# Patient Record
Sex: FEMALE | Race: WHITE | NOT HISPANIC OR LATINO
[De-identification: names, ages, dates, MRNs, and addresses within clinical notes are randomized per-mention and may not be internally consistent; named-entity substitution may affect disease eponyms.]

---

## 2017-09-11 ENCOUNTER — APPOINTMENT (OUTPATIENT)
Dept: ALLERGY | Facility: CLINIC | Age: 27
End: 2017-09-11

## 2018-02-07 ENCOUNTER — APPOINTMENT (OUTPATIENT)
Dept: OBGYN | Facility: CLINIC | Age: 28
End: 2018-02-07
Payer: COMMERCIAL

## 2018-02-07 PROCEDURE — 99203 OFFICE O/P NEW LOW 30 MIN: CPT

## 2019-03-28 ENCOUNTER — INPATIENT (INPATIENT)
Facility: HOSPITAL | Age: 29
LOS: 1 days | Discharge: HOME | End: 2019-03-30
Attending: SPECIALIST | Admitting: SPECIALIST
Payer: COMMERCIAL

## 2019-03-28 VITALS — HEIGHT: 71 IN | WEIGHT: 149.91 LBS

## 2019-03-28 DIAGNOSIS — O26.899 OTHER SPECIFIED PREGNANCY RELATED CONDITIONS, UNSPECIFIED TRIMESTER: ICD-10-CM

## 2019-03-28 DIAGNOSIS — Z3A.00 WEEKS OF GESTATION OF PREGNANCY NOT SPECIFIED: ICD-10-CM

## 2019-03-28 DIAGNOSIS — Z34.80 ENCOUNTER FOR SUPERVISION OF OTHER NORMAL PREGNANCY, UNSPECIFIED TRIMESTER: ICD-10-CM

## 2019-03-28 LAB
BASOPHILS # BLD AUTO: 0.1 K/UL — SIGNIFICANT CHANGE UP (ref 0–0.2)
BASOPHILS NFR BLD AUTO: 0.4 % — SIGNIFICANT CHANGE UP (ref 0–2)
BLD GP AB SCN SERPL QL: NEGATIVE — SIGNIFICANT CHANGE UP
EOSINOPHIL # BLD AUTO: 0 K/UL — SIGNIFICANT CHANGE UP (ref 0–0.5)
EOSINOPHIL NFR BLD AUTO: 0.3 % — SIGNIFICANT CHANGE UP (ref 0–6)
HCT VFR BLD CALC: 38.1 % — SIGNIFICANT CHANGE UP (ref 34.5–45)
HGB BLD-MCNC: 13.2 G/DL — SIGNIFICANT CHANGE UP (ref 11.5–15.5)
LYMPHOCYTES # BLD AUTO: 17.8 % — SIGNIFICANT CHANGE UP (ref 13–44)
LYMPHOCYTES # BLD AUTO: 2.4 K/UL — SIGNIFICANT CHANGE UP (ref 1–3.3)
MCHC RBC-ENTMCNC: 32.1 PG — SIGNIFICANT CHANGE UP (ref 27–34)
MCHC RBC-ENTMCNC: 34.6 GM/DL — SIGNIFICANT CHANGE UP (ref 32–36)
MCV RBC AUTO: 92.6 FL — SIGNIFICANT CHANGE UP (ref 80–100)
MONOCYTES # BLD AUTO: 0.7 K/UL — SIGNIFICANT CHANGE UP (ref 0–0.9)
MONOCYTES NFR BLD AUTO: 5.1 % — SIGNIFICANT CHANGE UP (ref 2–14)
NEUTROPHILS # BLD AUTO: 10.1 K/UL — HIGH (ref 1.8–7.4)
NEUTROPHILS NFR BLD AUTO: 76.3 % — SIGNIFICANT CHANGE UP (ref 43–77)
PLATELET # BLD AUTO: 339 K/UL — SIGNIFICANT CHANGE UP (ref 150–400)
RBC # BLD: 4.11 M/UL — SIGNIFICANT CHANGE UP (ref 3.8–5.2)
RBC # FLD: 11.9 % — SIGNIFICANT CHANGE UP (ref 10.3–14.5)
RH IG SCN BLD-IMP: POSITIVE — SIGNIFICANT CHANGE UP
RH IG SCN BLD-IMP: POSITIVE — SIGNIFICANT CHANGE UP
T PALLIDUM AB TITR SER: NEGATIVE — SIGNIFICANT CHANGE UP
WBC # BLD: 13.2 K/UL — HIGH (ref 3.8–10.5)
WBC # FLD AUTO: 13.2 K/UL — HIGH (ref 3.8–10.5)

## 2019-03-28 RX ORDER — HYDROCORTISONE 1 %
1 OINTMENT (GRAM) TOPICAL EVERY 4 HOURS
Qty: 0 | Refills: 0 | Status: DISCONTINUED | OUTPATIENT
Start: 2019-03-28 | End: 2019-03-30

## 2019-03-28 RX ORDER — SODIUM CHLORIDE 9 MG/ML
500 INJECTION, SOLUTION INTRAVENOUS ONCE
Qty: 0 | Refills: 0 | Status: COMPLETED | OUTPATIENT
Start: 2019-03-28 | End: 2019-03-28

## 2019-03-28 RX ORDER — AER TRAVELER 0.5 G/1
1 SOLUTION RECTAL; TOPICAL EVERY 4 HOURS
Qty: 0 | Refills: 0 | Status: DISCONTINUED | OUTPATIENT
Start: 2019-03-28 | End: 2019-03-28

## 2019-03-28 RX ORDER — TETANUS TOXOID, REDUCED DIPHTHERIA TOXOID AND ACELLULAR PERTUSSIS VACCINE, ADSORBED 5; 2.5; 8; 8; 2.5 [IU]/.5ML; [IU]/.5ML; UG/.5ML; UG/.5ML; UG/.5ML
0.5 SUSPENSION INTRAMUSCULAR ONCE
Qty: 0 | Refills: 0 | Status: DISCONTINUED | OUTPATIENT
Start: 2019-03-28 | End: 2019-03-30

## 2019-03-28 RX ORDER — OXYCODONE HYDROCHLORIDE 5 MG/1
5 TABLET ORAL EVERY 4 HOURS
Qty: 0 | Refills: 0 | Status: DISCONTINUED | OUTPATIENT
Start: 2019-03-28 | End: 2019-03-30

## 2019-03-28 RX ORDER — MAGNESIUM HYDROXIDE 400 MG/1
30 TABLET, CHEWABLE ORAL
Qty: 0 | Refills: 0 | Status: DISCONTINUED | OUTPATIENT
Start: 2019-03-28 | End: 2019-03-30

## 2019-03-28 RX ORDER — OXYTOCIN 10 UNIT/ML
41.67 VIAL (ML) INJECTION
Qty: 20 | Refills: 0 | Status: DISCONTINUED | OUTPATIENT
Start: 2019-03-28 | End: 2019-03-30

## 2019-03-28 RX ORDER — KETOROLAC TROMETHAMINE 30 MG/ML
30 SYRINGE (ML) INJECTION ONCE
Qty: 0 | Refills: 0 | Status: DISCONTINUED | OUTPATIENT
Start: 2019-03-28 | End: 2019-03-28

## 2019-03-28 RX ORDER — IBUPROFEN 200 MG
600 TABLET ORAL EVERY 6 HOURS
Qty: 0 | Refills: 0 | Status: DISCONTINUED | OUTPATIENT
Start: 2019-03-28 | End: 2019-03-30

## 2019-03-28 RX ORDER — OXYTOCIN 10 UNIT/ML
333.33 VIAL (ML) INJECTION
Qty: 20 | Refills: 0 | Status: COMPLETED | OUTPATIENT
Start: 2019-03-28

## 2019-03-28 RX ORDER — HYDROCORTISONE 1 %
1 OINTMENT (GRAM) TOPICAL EVERY 4 HOURS
Qty: 0 | Refills: 0 | Status: DISCONTINUED | OUTPATIENT
Start: 2019-03-28 | End: 2019-03-28

## 2019-03-28 RX ORDER — IBUPROFEN 200 MG
600 TABLET ORAL EVERY 6 HOURS
Qty: 0 | Refills: 0 | Status: COMPLETED | OUTPATIENT
Start: 2019-03-28 | End: 2020-02-24

## 2019-03-28 RX ORDER — GLYCERIN ADULT
1 SUPPOSITORY, RECTAL RECTAL AT BEDTIME
Qty: 0 | Refills: 0 | Status: DISCONTINUED | OUTPATIENT
Start: 2019-03-28 | End: 2019-03-30

## 2019-03-28 RX ORDER — OXYTOCIN 10 UNIT/ML
41.67 VIAL (ML) INJECTION
Qty: 20 | Refills: 0 | Status: DISCONTINUED | OUTPATIENT
Start: 2019-03-28 | End: 2019-03-28

## 2019-03-28 RX ORDER — DIBUCAINE 1 %
1 OINTMENT (GRAM) RECTAL EVERY 4 HOURS
Qty: 0 | Refills: 0 | Status: DISCONTINUED | OUTPATIENT
Start: 2019-03-28 | End: 2019-03-30

## 2019-03-28 RX ORDER — OXYCODONE HYDROCHLORIDE 5 MG/1
5 TABLET ORAL
Qty: 0 | Refills: 0 | Status: DISCONTINUED | OUTPATIENT
Start: 2019-03-28 | End: 2019-03-30

## 2019-03-28 RX ORDER — DOCUSATE SODIUM 100 MG
100 CAPSULE ORAL
Qty: 0 | Refills: 0 | Status: DISCONTINUED | OUTPATIENT
Start: 2019-03-28 | End: 2019-03-30

## 2019-03-28 RX ORDER — PRAMOXINE HYDROCHLORIDE 150 MG/15G
1 AEROSOL, FOAM RECTAL EVERY 4 HOURS
Qty: 0 | Refills: 0 | Status: DISCONTINUED | OUTPATIENT
Start: 2019-03-28 | End: 2019-03-30

## 2019-03-28 RX ORDER — DIPHENHYDRAMINE HCL 50 MG
25 CAPSULE ORAL EVERY 6 HOURS
Qty: 0 | Refills: 0 | Status: DISCONTINUED | OUTPATIENT
Start: 2019-03-28 | End: 2019-03-30

## 2019-03-28 RX ORDER — PRAMOXINE HYDROCHLORIDE 150 MG/15G
1 AEROSOL, FOAM RECTAL EVERY 4 HOURS
Qty: 0 | Refills: 0 | Status: DISCONTINUED | OUTPATIENT
Start: 2019-03-28 | End: 2019-03-28

## 2019-03-28 RX ORDER — SIMETHICONE 80 MG/1
80 TABLET, CHEWABLE ORAL EVERY 6 HOURS
Qty: 0 | Refills: 0 | Status: DISCONTINUED | OUTPATIENT
Start: 2019-03-28 | End: 2019-03-30

## 2019-03-28 RX ORDER — SODIUM CHLORIDE 9 MG/ML
1000 INJECTION, SOLUTION INTRAVENOUS
Qty: 0 | Refills: 0 | Status: DISCONTINUED | OUTPATIENT
Start: 2019-03-28 | End: 2019-03-28

## 2019-03-28 RX ORDER — AER TRAVELER 0.5 G/1
1 SOLUTION RECTAL; TOPICAL EVERY 4 HOURS
Qty: 0 | Refills: 0 | Status: DISCONTINUED | OUTPATIENT
Start: 2019-03-28 | End: 2019-03-30

## 2019-03-28 RX ORDER — CITRIC ACID/SODIUM CITRATE 300-500 MG
15 SOLUTION, ORAL ORAL EVERY 4 HOURS
Qty: 0 | Refills: 0 | Status: DISCONTINUED | OUTPATIENT
Start: 2019-03-28 | End: 2019-03-28

## 2019-03-28 RX ORDER — SODIUM CHLORIDE 9 MG/ML
3 INJECTION INTRAMUSCULAR; INTRAVENOUS; SUBCUTANEOUS EVERY 8 HOURS
Qty: 0 | Refills: 0 | Status: DISCONTINUED | OUTPATIENT
Start: 2019-03-28 | End: 2019-03-30

## 2019-03-28 RX ORDER — DIBUCAINE 1 %
1 OINTMENT (GRAM) RECTAL EVERY 4 HOURS
Qty: 0 | Refills: 0 | Status: DISCONTINUED | OUTPATIENT
Start: 2019-03-28 | End: 2019-03-28

## 2019-03-28 RX ORDER — ACETAMINOPHEN 500 MG
975 TABLET ORAL EVERY 6 HOURS
Qty: 0 | Refills: 0 | Status: DISCONTINUED | OUTPATIENT
Start: 2019-03-28 | End: 2019-03-30

## 2019-03-28 RX ORDER — BENZOCAINE 10 %
1 GEL (GRAM) MUCOUS MEMBRANE EVERY 6 HOURS
Qty: 0 | Refills: 0 | Status: DISCONTINUED | OUTPATIENT
Start: 2019-03-28 | End: 2019-03-30

## 2019-03-28 RX ORDER — SODIUM CHLORIDE 9 MG/ML
3 INJECTION INTRAMUSCULAR; INTRAVENOUS; SUBCUTANEOUS EVERY 8 HOURS
Qty: 0 | Refills: 0 | Status: DISCONTINUED | OUTPATIENT
Start: 2019-03-28 | End: 2019-03-28

## 2019-03-28 RX ORDER — OXYTOCIN 10 UNIT/ML
333.33 VIAL (ML) INJECTION
Qty: 20 | Refills: 0 | Status: DISCONTINUED | OUTPATIENT
Start: 2019-03-28 | End: 2019-03-28

## 2019-03-28 RX ORDER — LANOLIN
1 OINTMENT (GRAM) TOPICAL EVERY 6 HOURS
Qty: 0 | Refills: 0 | Status: DISCONTINUED | OUTPATIENT
Start: 2019-03-28 | End: 2019-03-30

## 2019-03-28 RX ORDER — ACETAMINOPHEN 500 MG
975 TABLET ORAL EVERY 6 HOURS
Qty: 0 | Refills: 0 | Status: COMPLETED | OUTPATIENT
Start: 2019-03-28 | End: 2020-02-24

## 2019-03-28 RX ADMIN — Medication 1000 MILLIUNIT(S)/MIN: at 12:38

## 2019-03-28 RX ADMIN — Medication 975 MILLIGRAM(S): at 18:17

## 2019-03-28 RX ADMIN — SODIUM CHLORIDE 3 MILLILITER(S): 9 INJECTION INTRAMUSCULAR; INTRAVENOUS; SUBCUTANEOUS at 22:49

## 2019-03-28 RX ADMIN — SODIUM CHLORIDE 250 MILLILITER(S): 9 INJECTION, SOLUTION INTRAVENOUS at 08:16

## 2019-03-28 RX ADMIN — SODIUM CHLORIDE 1000 MILLILITER(S): 9 INJECTION, SOLUTION INTRAVENOUS at 08:17

## 2019-03-28 RX ADMIN — Medication 125 MILLIUNIT(S)/MIN: at 12:33

## 2019-03-28 RX ADMIN — Medication 30 MILLIGRAM(S): at 13:30

## 2019-03-28 RX ADMIN — Medication 30 MILLIGRAM(S): at 13:13

## 2019-03-28 RX ADMIN — Medication 600 MILLIGRAM(S): at 23:40

## 2019-03-28 RX ADMIN — Medication 975 MILLIGRAM(S): at 18:45

## 2019-03-28 RX ADMIN — Medication 600 MILLIGRAM(S): at 22:49

## 2019-03-28 NOTE — PRE-ANESTHESIA EVALUATION ADULT - NSANTHOSAYNRD_GEN_A_CORE
No. BRUCE screening performed.  STOP BANG Legend: 0-2 = LOW Risk; 3-4 = INTERMEDIATE Risk; 5-8 = HIGH Risk Statement Selected

## 2019-03-28 NOTE — PROVIDER CONTACT NOTE (OTHER) - SITUATION
pt. felt tightness in chest when getting out of bed. Did not mention to RN until after walking to bathroom and once getting into bed.  pt. said she felt light headed and her chest felt tight only once

## 2019-03-28 NOTE — PROVIDER CONTACT NOTE (OTHER) - ACTION/TREATMENT ORDERED:
since the patient only felt her chest tight once when she got out of bed, no action at this time. Pt. to notify RN if she feels this again. pt. to go to post partum, will continue to monitor

## 2019-03-29 LAB
HCT VFR BLD CALC: 32.7 % — LOW (ref 34.5–45)
HGB BLD-MCNC: 11.3 G/DL — LOW (ref 11.5–15.5)

## 2019-03-29 RX ADMIN — Medication 975 MILLIGRAM(S): at 01:30

## 2019-03-29 RX ADMIN — Medication 600 MILLIGRAM(S): at 11:27

## 2019-03-29 RX ADMIN — Medication 100 MILLIGRAM(S): at 11:27

## 2019-03-29 RX ADMIN — Medication 600 MILLIGRAM(S): at 12:33

## 2019-03-29 RX ADMIN — Medication 600 MILLIGRAM(S): at 18:28

## 2019-03-29 RX ADMIN — Medication 600 MILLIGRAM(S): at 17:14

## 2019-03-29 RX ADMIN — Medication 975 MILLIGRAM(S): at 06:48

## 2019-03-29 RX ADMIN — Medication 600 MILLIGRAM(S): at 23:41

## 2019-03-29 RX ADMIN — Medication 600 MILLIGRAM(S): at 06:30

## 2019-03-29 RX ADMIN — Medication 975 MILLIGRAM(S): at 20:17

## 2019-03-29 RX ADMIN — Medication 975 MILLIGRAM(S): at 00:40

## 2019-03-29 RX ADMIN — Medication 600 MILLIGRAM(S): at 05:34

## 2019-03-29 RX ADMIN — Medication 975 MILLIGRAM(S): at 21:15

## 2019-03-29 RX ADMIN — Medication 975 MILLIGRAM(S): at 14:10

## 2019-03-29 RX ADMIN — Medication 975 MILLIGRAM(S): at 15:00

## 2019-03-29 NOTE — PROGRESS NOTE ADULT - PROBLEM SELECTOR PLAN 1
- Pain well controlled, continue current pain regimen  - Increase ambulation, SCDs when not ambulating  - Continue regular diet  -AM H/H    Yulia Lobo PGY1

## 2019-03-29 NOTE — PROGRESS NOTE ADULT - SUBJECTIVE AND OBJECTIVE BOX
OB Progress Note:  PPD#1    S: 27yo  PPD#1 s/p . Patient feels well. Pain is well controlled. She is tolerating a regular diet and passing flatus. She is voiding spontaneously, and ambulating without difficulty. Denies CP/SOB. Denies lightheadedness/dizziness. Denies N/V.    O:  Vitals:  Vital Signs Last 24 Hrs  T(C): 36.6 (29 Mar 2019 06:00), Max: 37.1 (28 Mar 2019 21:27)  T(F): 97.9 (29 Mar 2019 06:00), Max: 98.7 (28 Mar 2019 21:27)  HR: 69 (29 Mar 2019 06:00) (60 - 82)  BP: 100/67 (29 Mar 2019 06:00) (95/60 - 117/56)  BP(mean): 76 (28 Mar 2019 15:21) (65 - 78)  RR: 18 (29 Mar 2019 06:00) (12 - 18)  SpO2: 98% (28 Mar 2019 21:27) (95% - 99%)    MEDICATIONS  (STANDING):  acetaminophen   Tablet .. 975 milliGRAM(s) Oral every 6 hours  diphtheria/tetanus/pertussis (acellular) Vaccine (ADAcel) 0.5 milliLiter(s) IntraMuscular once  ibuprofen  Tablet. 600 milliGRAM(s) Oral every 6 hours  oxyCODONE    IR 5 milliGRAM(s) Oral every 3 hours  oxytocin Infusion 41.667 milliUNIT(s)/Min (125 mL/Hr) IV Continuous <Continuous>  prenatal multivitamin 1 Tablet(s) Oral daily  sodium chloride 0.9% lock flush 3 milliLiter(s) IV Push every 8 hours      Labs:  Blood type: A Positive  Rubella IgG: RPR: Negative                          13.2   13.2<H> >-----------< 339    (  @ 07:41 )             38.1          Physical Exam:  General: NAD  Abdomen: soft, non-tender, non-distended, fundus firm  Vaginal: Lochia wnl  Extremities: No erythema/edema

## 2019-03-29 NOTE — PROGRESS NOTE ADULT - ATTENDING COMMENTS
Pt seen and examined by me. Had gush of blood this AM after using the bathroom.  Also c/o pain at epi site. No fevers or chills. Otherwise since then bleeding has been fine.  VSS and wnl. Epi site wnl. no E/O infection. Bleeding on pad wnl.  Pt to continue pain meds and to continue to monitor bleeding. H&H 11.3/32.7 this AM. Will continue to monitor.     TOMMY Romo MD  [phone]

## 2019-03-30 ENCOUNTER — TRANSCRIPTION ENCOUNTER (OUTPATIENT)
Age: 29
End: 2019-03-30

## 2019-03-30 VITALS
TEMPERATURE: 98 F | DIASTOLIC BLOOD PRESSURE: 62 MMHG | SYSTOLIC BLOOD PRESSURE: 96 MMHG | RESPIRATION RATE: 18 BRPM | HEART RATE: 69 BPM

## 2019-03-30 PROCEDURE — 86780 TREPONEMA PALLIDUM: CPT

## 2019-03-30 PROCEDURE — 59050 FETAL MONITOR W/REPORT: CPT

## 2019-03-30 PROCEDURE — 85027 COMPLETE CBC AUTOMATED: CPT

## 2019-03-30 PROCEDURE — 59025 FETAL NON-STRESS TEST: CPT

## 2019-03-30 PROCEDURE — G0463: CPT

## 2019-03-30 PROCEDURE — 86850 RBC ANTIBODY SCREEN: CPT

## 2019-03-30 PROCEDURE — 85014 HEMATOCRIT: CPT

## 2019-03-30 PROCEDURE — 86901 BLOOD TYPING SEROLOGIC RH(D): CPT

## 2019-03-30 PROCEDURE — 85018 HEMOGLOBIN: CPT

## 2019-03-30 PROCEDURE — 86900 BLOOD TYPING SEROLOGIC ABO: CPT

## 2019-03-30 RX ORDER — ACETAMINOPHEN 500 MG
3 TABLET ORAL
Qty: 0 | Refills: 0 | DISCHARGE
Start: 2019-03-30

## 2019-03-30 RX ORDER — IBUPROFEN 200 MG
1 TABLET ORAL
Qty: 0 | Refills: 0 | DISCHARGE
Start: 2019-03-30

## 2019-03-30 RX ADMIN — Medication 975 MILLIGRAM(S): at 03:47

## 2019-03-30 RX ADMIN — Medication 600 MILLIGRAM(S): at 05:51

## 2019-03-30 RX ADMIN — Medication 975 MILLIGRAM(S): at 04:20

## 2019-03-30 RX ADMIN — Medication 100 MILLIGRAM(S): at 10:57

## 2019-03-30 RX ADMIN — Medication 600 MILLIGRAM(S): at 06:22

## 2019-03-30 RX ADMIN — Medication 600 MILLIGRAM(S): at 00:15

## 2019-03-30 RX ADMIN — Medication 600 MILLIGRAM(S): at 10:56

## 2019-03-30 NOTE — DISCHARGE NOTE OB - CARE PLAN
Principal Discharge DX:	Vaginal delivery  Goal:	Rest  Assessment and plan of treatment:	Please call the office to schedule a 6 weeks postpartum appointment.

## 2019-03-30 NOTE — DISCHARGE NOTE OB - MEDICATION SUMMARY - MEDICATIONS TO TAKE
I will START or STAY ON the medications listed below when I get home from the hospital:    acetaminophen 325 mg oral tablet  -- 3 tab(s) by mouth every 6 hours  -- Indication: For Pain    ibuprofen 600 mg oral tablet  -- 1 tab(s) by mouth every 6 hours  -- Indication: For Pain    Prenatal Multivitamins with Folic Acid 1 mg oral tablet  -- 1 tab(s) by mouth once a day  -- Indication: For Postpartum

## 2019-03-30 NOTE — DISCHARGE NOTE OB - PATIENT PORTAL LINK FT
You can access the [facility] Patient Portal, offered by [facility], by registering with the following website: [email or web address]

## 2019-03-30 NOTE — PROGRESS NOTE ADULT - ASSESSMENT
S/P vaginal delivery on 3/28 with RML repair  She is doing well  Continue with PO pain medication  Ambulation encouraged.  Discharge instructions reviewed as written in EMR  Follow up in office in 6 weeks  Call MD if fever, pain, heavy VB, HA or visual changes.    Demetrice Love MD

## 2019-03-30 NOTE — DISCHARGE NOTE OB - CARE PROVIDER_API CALL
Demetrice Love)  OBSGYN  General  [facility] [place] [id number]  Phone: [phone]  Fax: [phone]  Follow Up Time:

## 2019-03-30 NOTE — PROGRESS NOTE ADULT - SUBJECTIVE AND OBJECTIVE BOX
VANNA SEXTON  MRN-[id number]  28y    Subjective:  This patient is doing well.  She is ambulating, tolerating diet, lochia wnl, no CP, SOB, N/V/D.  No HA, no visual changes.  Vital Signs Last 24 Hrs  T(C): 36.8 (30 Mar 2019 05:57), Max: 36.8 (29 Mar 2019 18:00)  T(F): 98.3 (30 Mar 2019 05:57), Max: 98.3 (30 Mar 2019 05:57)  HR: 69 (30 Mar 2019 05:57) (69 - 70)  BP: 96/62 (30 Mar 2019 05:57) (96/62 - 99/66)  BP(mean): --  RR: 18 (30 Mar 2019 05:57) (18 - 18)  SpO2: 100% (29 Mar 2019 18:00) (100% - 100%)    I&O's Summary                            11.3   x     )-----------( x        ( 29 Mar 2019 06:54 )             32.7       Allergies    Allergy Status Unknown    Intolerances        MEDICATIONS  (STANDING):  acetaminophen   Tablet .. 975 milliGRAM(s) Oral every 6 hours  diphtheria/tetanus/pertussis (acellular) Vaccine (ADAcel) 0.5 milliLiter(s) IntraMuscular once  ibuprofen  Tablet. 600 milliGRAM(s) Oral every 6 hours  oxyCODONE    IR 5 milliGRAM(s) Oral every 3 hours  oxytocin Infusion 41.667 milliUNIT(s)/Min (125 mL/Hr) IV Continuous <Continuous>  prenatal multivitamin 1 Tablet(s) Oral daily  sodium chloride 0.9% lock flush 3 milliLiter(s) IV Push every 8 hours    MEDICATIONS  (PRN):  benzocaine 20%/menthol 0.5% Spray 1 Spray(s) Topical every 6 hours PRN Perineal discomfort  dibucaine 1% Ointment 1 Application(s) Topical every 4 hours PRN Perineal Discomfort  diphenhydrAMINE 25 milliGRAM(s) Oral every 6 hours PRN Itching  docusate sodium 100 milliGRAM(s) Oral two times a day PRN Stool Softening  glycerin Suppository - Adult 1 Suppository(s) Rectal at bedtime PRN Constipation  hydrocortisone 1% Cream 1 Application(s) Topical every 4 hours PRN Moderate to Severe Perineal Pain  lanolin Ointment 1 Application(s) Topical every 6 hours PRN Sore Nipples  magnesium hydroxide Suspension 30 milliLiter(s) Oral two times a day PRN Constipation  oxyCODONE    IR 5 milliGRAM(s) Oral every 4 hours PRN Severe Pain (7 -10)  pramoxine 1%/zinc 5% Cream 1 Application(s) Topical every 4 hours PRN Moderate to Severe Perineal Pain  simethicone 80 milliGRAM(s) Chew every 6 hours PRN Gas  witch hazel Pads 1 Application(s) Topical every 4 hours PRN Perineal Discomfort      PHYSICAL EXAM:    Extremities: non-tender bilateraly  Abdomen: soft, nontender, fundus firm

## 2019-03-30 NOTE — DISCHARGE NOTE OB - MATERIALS PROVIDED
Breastfeeding Mother’s Support Group Information/Birth Certificate Instructions/[facility] Hearing Screen Program/Back To Sleep Handout/Shaken Baby Prevention Handout/[facility] [place] Screening Program

## 2019-05-07 ENCOUNTER — RESULT REVIEW (OUTPATIENT)
Age: 29
End: 2019-05-07

## 2019-11-12 ENCOUNTER — TRANSCRIPTION ENCOUNTER (OUTPATIENT)
Age: 29
End: 2019-11-12

## 2020-03-19 ENCOUNTER — RESULT REVIEW (OUTPATIENT)
Age: 30
End: 2020-03-19

## 2020-09-11 ENCOUNTER — OUTPATIENT (OUTPATIENT)
Dept: OUTPATIENT SERVICES | Facility: HOSPITAL | Age: 30
LOS: 1 days | End: 2020-09-11
Payer: COMMERCIAL

## 2020-09-11 DIAGNOSIS — Z3A.00 WEEKS OF GESTATION OF PREGNANCY NOT SPECIFIED: ICD-10-CM

## 2020-09-11 DIAGNOSIS — O26.899 OTHER SPECIFIED PREGNANCY RELATED CONDITIONS, UNSPECIFIED TRIMESTER: ICD-10-CM

## 2020-09-11 PROCEDURE — G0463: CPT

## 2020-09-11 PROCEDURE — 59025 FETAL NON-STRESS TEST: CPT

## 2020-09-11 PROCEDURE — 59025 FETAL NON-STRESS TEST: CPT | Mod: 26

## 2020-09-22 ENCOUNTER — TRANSCRIPTION ENCOUNTER (OUTPATIENT)
Age: 30
End: 2020-09-22

## 2020-10-07 ENCOUNTER — TRANSCRIPTION ENCOUNTER (OUTPATIENT)
Age: 30
End: 2020-10-07

## 2020-10-07 ENCOUNTER — OUTPATIENT (OUTPATIENT)
Dept: OUTPATIENT SERVICES | Facility: HOSPITAL | Age: 30
LOS: 1 days | End: 2020-10-07
Payer: COMMERCIAL

## 2020-10-07 VITALS
HEART RATE: 76 BPM | TEMPERATURE: 98 F | SYSTOLIC BLOOD PRESSURE: 102 MMHG | DIASTOLIC BLOOD PRESSURE: 57 MMHG | OXYGEN SATURATION: 97 % | RESPIRATION RATE: 16 BRPM

## 2020-10-07 DIAGNOSIS — O26.899 OTHER SPECIFIED PREGNANCY RELATED CONDITIONS, UNSPECIFIED TRIMESTER: ICD-10-CM

## 2020-10-07 DIAGNOSIS — Z3A.00 WEEKS OF GESTATION OF PREGNANCY NOT SPECIFIED: ICD-10-CM

## 2020-10-07 DIAGNOSIS — Z34.80 ENCOUNTER FOR SUPERVISION OF OTHER NORMAL PREGNANCY, UNSPECIFIED TRIMESTER: ICD-10-CM

## 2020-10-07 LAB
BASOPHILS # BLD AUTO: 0.02 K/UL — SIGNIFICANT CHANGE UP (ref 0–0.2)
BASOPHILS NFR BLD AUTO: 0.2 % — SIGNIFICANT CHANGE UP (ref 0–2)
BLD GP AB SCN SERPL QL: NEGATIVE — SIGNIFICANT CHANGE UP
EOSINOPHIL # BLD AUTO: 0.02 K/UL — SIGNIFICANT CHANGE UP (ref 0–0.5)
EOSINOPHIL NFR BLD AUTO: 0.2 % — SIGNIFICANT CHANGE UP (ref 0–6)
HCT VFR BLD CALC: 35 % — SIGNIFICANT CHANGE UP (ref 34.5–45)
HGB BLD-MCNC: 11.6 G/DL — SIGNIFICANT CHANGE UP (ref 11.5–15.5)
IMM GRANULOCYTES NFR BLD AUTO: 0.6 % — SIGNIFICANT CHANGE UP (ref 0–1.5)
LYMPHOCYTES # BLD AUTO: 1.64 K/UL — SIGNIFICANT CHANGE UP (ref 1–3.3)
LYMPHOCYTES # BLD AUTO: 15.7 % — SIGNIFICANT CHANGE UP (ref 13–44)
MCHC RBC-ENTMCNC: 30.1 PG — SIGNIFICANT CHANGE UP (ref 27–34)
MCHC RBC-ENTMCNC: 33.1 GM/DL — SIGNIFICANT CHANGE UP (ref 32–36)
MCV RBC AUTO: 90.7 FL — SIGNIFICANT CHANGE UP (ref 80–100)
MONOCYTES # BLD AUTO: 0.6 K/UL — SIGNIFICANT CHANGE UP (ref 0–0.9)
MONOCYTES NFR BLD AUTO: 5.8 % — SIGNIFICANT CHANGE UP (ref 2–14)
NEUTROPHILS # BLD AUTO: 8.09 K/UL — HIGH (ref 1.8–7.4)
NEUTROPHILS NFR BLD AUTO: 77.5 % — HIGH (ref 43–77)
NRBC # BLD: 0 /100 WBCS — SIGNIFICANT CHANGE UP (ref 0–0)
PLATELET # BLD AUTO: 314 K/UL — SIGNIFICANT CHANGE UP (ref 150–400)
RBC # BLD: 3.86 M/UL — SIGNIFICANT CHANGE UP (ref 3.8–5.2)
RBC # FLD: 12.3 % — SIGNIFICANT CHANGE UP (ref 10.3–14.5)
RH IG SCN BLD-IMP: POSITIVE — SIGNIFICANT CHANGE UP
SARS-COV-2 RNA SPEC QL NAA+PROBE: SIGNIFICANT CHANGE UP
WBC # BLD: 10.43 K/UL — SIGNIFICANT CHANGE UP (ref 3.8–10.5)
WBC # FLD AUTO: 10.43 K/UL — SIGNIFICANT CHANGE UP (ref 3.8–10.5)

## 2020-10-07 PROCEDURE — 86769 SARS-COV-2 COVID-19 ANTIBODY: CPT

## 2020-10-07 PROCEDURE — 86780 TREPONEMA PALLIDUM: CPT

## 2020-10-07 PROCEDURE — 86850 RBC ANTIBODY SCREEN: CPT

## 2020-10-07 PROCEDURE — 87635 SARS-COV-2 COVID-19 AMP PRB: CPT

## 2020-10-07 PROCEDURE — 59025 FETAL NON-STRESS TEST: CPT

## 2020-10-07 PROCEDURE — 86901 BLOOD TYPING SEROLOGIC RH(D): CPT

## 2020-10-07 PROCEDURE — 85025 COMPLETE CBC W/AUTO DIFF WBC: CPT

## 2020-10-07 PROCEDURE — 86900 BLOOD TYPING SEROLOGIC ABO: CPT

## 2020-10-07 PROCEDURE — G0463: CPT

## 2020-10-07 RX ORDER — SODIUM CHLORIDE 9 MG/ML
500 INJECTION, SOLUTION INTRAVENOUS ONCE
Refills: 0 | Status: DISCONTINUED | OUTPATIENT
Start: 2020-10-07 | End: 2020-10-07

## 2020-10-07 RX ORDER — CITRIC ACID/SODIUM CITRATE 300-500 MG
15 SOLUTION, ORAL ORAL EVERY 6 HOURS
Refills: 0 | Status: DISCONTINUED | OUTPATIENT
Start: 2020-10-07 | End: 2020-10-07

## 2020-10-07 RX ORDER — OXYTOCIN 10 UNIT/ML
333.33 VIAL (ML) INJECTION
Qty: 20 | Refills: 0 | Status: DISCONTINUED | OUTPATIENT
Start: 2020-10-07 | End: 2020-10-07

## 2020-10-07 RX ORDER — SODIUM CHLORIDE 9 MG/ML
1000 INJECTION, SOLUTION INTRAVENOUS
Refills: 0 | Status: DISCONTINUED | OUTPATIENT
Start: 2020-10-07 | End: 2020-10-07

## 2020-10-07 RX ORDER — AMPICILLIN TRIHYDRATE 250 MG
2 CAPSULE ORAL ONCE
Refills: 0 | Status: COMPLETED | OUTPATIENT
Start: 2020-10-07 | End: 2020-10-07

## 2020-10-07 RX ORDER — AMPICILLIN TRIHYDRATE 250 MG
1 CAPSULE ORAL EVERY 4 HOURS
Refills: 0 | Status: DISCONTINUED | OUTPATIENT
Start: 2020-10-07 | End: 2020-10-07

## 2020-10-07 RX ADMIN — Medication 12 MILLIGRAM(S): at 20:06

## 2020-10-07 RX ADMIN — Medication 216 GRAM(S): at 19:52

## 2020-10-07 NOTE — DISCHARGE NOTE ANTEPARTUM - PATIENT PORTAL LINK FT
You can access the FollowMyHealth Patient Portal offered by [facility] by registering at the following website: [email or web address]. By joining [facility]’s FollowMyHealth portal, you will also be able to view your health information using other applications (apps) compatible with our system.

## 2020-10-07 NOTE — DISCHARGE NOTE ANTEPARTUM - MEDICATION SUMMARY - MEDICATIONS TO STOP TAKING
I will STOP taking the medications listed below when I get home from the hospital:    acetaminophen 325 mg oral tablet  -- 3 tab(s) by mouth every 6 hours

## 2020-10-07 NOTE — DISCHARGE NOTE ANTEPARTUM - CARE PLAN
Principal Discharge DX:	 contractions  Goal:	Continue a healthy pregnancy  Assessment and plan of treatment:	Return to labor and delivery tomorrow night at 8pm for the second dose of your betamethasone.   Principal Discharge DX:	 contractions  Goal:	Continue a healthy pregnancy  Assessment and plan of treatment:	Return to labor and delivery tomorrow night at 8pm for the second dose of your betamethasone. Please return to labor and delivery at any time for worsening contractions, vaginal bleeding, loss of fluid, decreased fetal movement, or any other concerns.

## 2020-10-07 NOTE — DISCHARGE NOTE ANTEPARTUM - MEDICATION SUMMARY - MEDICATIONS TO TAKE
I will START or STAY ON the medications listed below when I get home from the hospital:    Prenatal Multivitamins with Folic Acid 1 mg oral tablet  -- 1 tab(s) by mouth once a day  -- Indication: For home medication

## 2020-10-07 NOTE — DISCHARGE NOTE ANTEPARTUM - HOSPITAL COURSE
Pt admitted for  contractions and given betamethasone. During stay on labor and delivery, pt did not make any cervical change and elected to be discharged home to return tomorrow for second dose of betamethasone on labor and delivery. Labor precautions given.

## 2020-10-07 NOTE — DISCHARGE NOTE ANTEPARTUM - CARE PROVIDER_API CALL
Leeroy Villanueva AND [address] [place] [id number]  Phone: [phone]  Fax: [phone]  Follow Up Time:

## 2020-10-07 NOTE — DISCHARGE NOTE ANTEPARTUM - PLAN OF CARE
Continue a healthy pregnancy Return to labor and delivery tomorrow night at 8pm for the second dose of your betamethasone. Return to labor and delivery tomorrow night at 8pm for the second dose of your betamethasone. Please return to labor and delivery at any time for worsening contractions, vaginal bleeding, loss of fluid, decreased fetal movement, or any other concerns.

## 2020-10-07 NOTE — DISCHARGE NOTE ANTEPARTUM - ADDITIONAL INSTRUCTIONS
Return to labor and delivery tomorrow night at 8pm for the second dose of your betamethasone. Please return to labor and delivery at any time for worsening contractions, vaginal bleeding, loss of fluid, decreased fetal movement, or any other concerns.

## 2020-10-08 ENCOUNTER — OUTPATIENT (OUTPATIENT)
Dept: OUTPATIENT SERVICES | Facility: HOSPITAL | Age: 30
LOS: 1 days | End: 2020-10-08
Payer: COMMERCIAL

## 2020-10-08 DIAGNOSIS — O26.899 OTHER SPECIFIED PREGNANCY RELATED CONDITIONS, UNSPECIFIED TRIMESTER: ICD-10-CM

## 2020-10-08 DIAGNOSIS — Z3A.00 WEEKS OF GESTATION OF PREGNANCY NOT SPECIFIED: ICD-10-CM

## 2020-10-08 LAB
SARS-COV-2 IGG SERPL QL IA: NEGATIVE — SIGNIFICANT CHANGE UP
SARS-COV-2 IGM SERPL IA-ACNC: 0.08 INDEX — SIGNIFICANT CHANGE UP
T PALLIDUM AB TITR SER: NEGATIVE — SIGNIFICANT CHANGE UP

## 2020-10-08 PROCEDURE — G0463: CPT

## 2020-10-08 PROCEDURE — 59025 FETAL NON-STRESS TEST: CPT

## 2020-10-08 RX ADMIN — Medication 12 MILLIGRAM(S): at 21:35

## 2020-10-25 ENCOUNTER — APPOINTMENT (OUTPATIENT)
Dept: DISASTER EMERGENCY | Facility: CLINIC | Age: 30
End: 2020-10-25

## 2020-10-25 DIAGNOSIS — Z01.818 ENCOUNTER FOR OTHER PREPROCEDURAL EXAMINATION: ICD-10-CM

## 2020-10-26 LAB — SARS-COV-2 N GENE NPH QL NAA+PROBE: NOT DETECTED

## 2020-10-28 ENCOUNTER — INPATIENT (INPATIENT)
Facility: HOSPITAL | Age: 30
LOS: 0 days | Discharge: HOME | End: 2020-10-29
Attending: OBSTETRICS & GYNECOLOGY | Admitting: OBSTETRICS & GYNECOLOGY
Payer: COMMERCIAL

## 2020-10-28 VITALS — HEIGHT: 71 IN | WEIGHT: 149.91 LBS

## 2020-10-28 DIAGNOSIS — O48.0 POST-TERM PREGNANCY: ICD-10-CM

## 2020-10-28 LAB
BASOPHILS # BLD AUTO: 0.03 K/UL — SIGNIFICANT CHANGE UP (ref 0–0.2)
BASOPHILS NFR BLD AUTO: 0.4 % — SIGNIFICANT CHANGE UP (ref 0–2)
BLD GP AB SCN SERPL QL: NEGATIVE — SIGNIFICANT CHANGE UP
EOSINOPHIL # BLD AUTO: 0.04 K/UL — SIGNIFICANT CHANGE UP (ref 0–0.5)
EOSINOPHIL NFR BLD AUTO: 0.5 % — SIGNIFICANT CHANGE UP (ref 0–6)
HCT VFR BLD CALC: 33 % — LOW (ref 34.5–45)
HGB BLD-MCNC: 10.7 G/DL — LOW (ref 11.5–15.5)
IMM GRANULOCYTES NFR BLD AUTO: 0.5 % — SIGNIFICANT CHANGE UP (ref 0–1.5)
LYMPHOCYTES # BLD AUTO: 2.37 K/UL — SIGNIFICANT CHANGE UP (ref 1–3.3)
LYMPHOCYTES # BLD AUTO: 30.8 % — SIGNIFICANT CHANGE UP (ref 13–44)
MCHC RBC-ENTMCNC: 29.6 PG — SIGNIFICANT CHANGE UP (ref 27–34)
MCHC RBC-ENTMCNC: 32.4 GM/DL — SIGNIFICANT CHANGE UP (ref 32–36)
MCV RBC AUTO: 91.2 FL — SIGNIFICANT CHANGE UP (ref 80–100)
MONOCYTES # BLD AUTO: 0.39 K/UL — SIGNIFICANT CHANGE UP (ref 0–0.9)
MONOCYTES NFR BLD AUTO: 5.1 % — SIGNIFICANT CHANGE UP (ref 2–14)
NEUTROPHILS # BLD AUTO: 4.82 K/UL — SIGNIFICANT CHANGE UP (ref 1.8–7.4)
NEUTROPHILS NFR BLD AUTO: 62.7 % — SIGNIFICANT CHANGE UP (ref 43–77)
NRBC # BLD: 0 /100 WBCS — SIGNIFICANT CHANGE UP (ref 0–0)
PLATELET # BLD AUTO: 256 K/UL — SIGNIFICANT CHANGE UP (ref 150–400)
RBC # BLD: 3.62 M/UL — LOW (ref 3.8–5.2)
RBC # FLD: 12.9 % — SIGNIFICANT CHANGE UP (ref 10.3–14.5)
RH IG SCN BLD-IMP: POSITIVE — SIGNIFICANT CHANGE UP
SARS-COV-2 IGG SERPL QL IA: NEGATIVE — SIGNIFICANT CHANGE UP
SARS-COV-2 IGM SERPL IA-ACNC: <0.1 INDEX — SIGNIFICANT CHANGE UP
T PALLIDUM AB TITR SER: NEGATIVE — SIGNIFICANT CHANGE UP
WBC # BLD: 7.69 K/UL — SIGNIFICANT CHANGE UP (ref 3.8–10.5)
WBC # FLD AUTO: 7.69 K/UL — SIGNIFICANT CHANGE UP (ref 3.8–10.5)

## 2020-10-28 RX ORDER — OXYCODONE HYDROCHLORIDE 5 MG/1
5 TABLET ORAL
Refills: 0 | Status: DISCONTINUED | OUTPATIENT
Start: 2020-10-28 | End: 2020-10-29

## 2020-10-28 RX ORDER — OXYTOCIN 10 UNIT/ML
333.33 VIAL (ML) INJECTION
Qty: 20 | Refills: 0 | Status: COMPLETED | OUTPATIENT
Start: 2020-10-28 | End: 2020-10-28

## 2020-10-28 RX ORDER — ACETAMINOPHEN 500 MG
975 TABLET ORAL
Refills: 0 | Status: DISCONTINUED | OUTPATIENT
Start: 2020-10-28 | End: 2020-10-29

## 2020-10-28 RX ORDER — AER TRAVELER 0.5 G/1
1 SOLUTION RECTAL; TOPICAL EVERY 4 HOURS
Refills: 0 | Status: DISCONTINUED | OUTPATIENT
Start: 2020-10-28 | End: 2020-10-29

## 2020-10-28 RX ORDER — BENZOCAINE 10 %
1 GEL (GRAM) MUCOUS MEMBRANE EVERY 6 HOURS
Refills: 0 | Status: DISCONTINUED | OUTPATIENT
Start: 2020-10-28 | End: 2020-10-29

## 2020-10-28 RX ORDER — LANOLIN
1 OINTMENT (GRAM) TOPICAL EVERY 6 HOURS
Refills: 0 | Status: DISCONTINUED | OUTPATIENT
Start: 2020-10-28 | End: 2020-10-29

## 2020-10-28 RX ORDER — OXYTOCIN 10 UNIT/ML
4 VIAL (ML) INJECTION
Qty: 30 | Refills: 0 | Status: DISCONTINUED | OUTPATIENT
Start: 2020-10-28 | End: 2020-10-29

## 2020-10-28 RX ORDER — DIPHENHYDRAMINE HCL 50 MG
25 CAPSULE ORAL EVERY 6 HOURS
Refills: 0 | Status: DISCONTINUED | OUTPATIENT
Start: 2020-10-28 | End: 2020-10-29

## 2020-10-28 RX ORDER — SODIUM CHLORIDE 9 MG/ML
3 INJECTION INTRAMUSCULAR; INTRAVENOUS; SUBCUTANEOUS EVERY 8 HOURS
Refills: 0 | Status: DISCONTINUED | OUTPATIENT
Start: 2020-10-28 | End: 2020-10-29

## 2020-10-28 RX ORDER — DIBUCAINE 1 %
1 OINTMENT (GRAM) RECTAL EVERY 6 HOURS
Refills: 0 | Status: DISCONTINUED | OUTPATIENT
Start: 2020-10-28 | End: 2020-10-29

## 2020-10-28 RX ORDER — OXYCODONE HYDROCHLORIDE 5 MG/1
5 TABLET ORAL ONCE
Refills: 0 | Status: DISCONTINUED | OUTPATIENT
Start: 2020-10-28 | End: 2020-10-29

## 2020-10-28 RX ORDER — IBUPROFEN 200 MG
600 TABLET ORAL EVERY 6 HOURS
Refills: 0 | Status: COMPLETED | OUTPATIENT
Start: 2020-10-28 | End: 2021-09-26

## 2020-10-28 RX ORDER — HYDROCORTISONE 1 %
1 OINTMENT (GRAM) TOPICAL EVERY 6 HOURS
Refills: 0 | Status: DISCONTINUED | OUTPATIENT
Start: 2020-10-28 | End: 2020-10-29

## 2020-10-28 RX ORDER — IBUPROFEN 200 MG
600 TABLET ORAL EVERY 6 HOURS
Refills: 0 | Status: DISCONTINUED | OUTPATIENT
Start: 2020-10-28 | End: 2020-10-29

## 2020-10-28 RX ORDER — CITRIC ACID/SODIUM CITRATE 300-500 MG
15 SOLUTION, ORAL ORAL EVERY 6 HOURS
Refills: 0 | Status: DISCONTINUED | OUTPATIENT
Start: 2020-10-28 | End: 2020-10-28

## 2020-10-28 RX ORDER — TETANUS TOXOID, REDUCED DIPHTHERIA TOXOID AND ACELLULAR PERTUSSIS VACCINE, ADSORBED 5; 2.5; 8; 8; 2.5 [IU]/.5ML; [IU]/.5ML; UG/.5ML; UG/.5ML; UG/.5ML
0.5 SUSPENSION INTRAMUSCULAR ONCE
Refills: 0 | Status: DISCONTINUED | OUTPATIENT
Start: 2020-10-28 | End: 2020-10-29

## 2020-10-28 RX ORDER — SODIUM CHLORIDE 9 MG/ML
1000 INJECTION, SOLUTION INTRAVENOUS
Refills: 0 | Status: DISCONTINUED | OUTPATIENT
Start: 2020-10-28 | End: 2020-10-28

## 2020-10-28 RX ORDER — PRAMOXINE HYDROCHLORIDE 150 MG/15G
1 AEROSOL, FOAM RECTAL EVERY 4 HOURS
Refills: 0 | Status: DISCONTINUED | OUTPATIENT
Start: 2020-10-28 | End: 2020-10-29

## 2020-10-28 RX ORDER — SIMETHICONE 80 MG/1
80 TABLET, CHEWABLE ORAL EVERY 4 HOURS
Refills: 0 | Status: DISCONTINUED | OUTPATIENT
Start: 2020-10-28 | End: 2020-10-29

## 2020-10-28 RX ORDER — KETOROLAC TROMETHAMINE 30 MG/ML
30 SYRINGE (ML) INJECTION ONCE
Refills: 0 | Status: DISCONTINUED | OUTPATIENT
Start: 2020-10-28 | End: 2020-10-28

## 2020-10-28 RX ORDER — OXYTOCIN 10 UNIT/ML
333.33 VIAL (ML) INJECTION
Qty: 20 | Refills: 0 | Status: DISCONTINUED | OUTPATIENT
Start: 2020-10-28 | End: 2020-10-29

## 2020-10-28 RX ORDER — MAGNESIUM HYDROXIDE 400 MG/1
30 TABLET, CHEWABLE ORAL
Refills: 0 | Status: DISCONTINUED | OUTPATIENT
Start: 2020-10-28 | End: 2020-10-29

## 2020-10-28 RX ADMIN — Medication 600 MILLIGRAM(S): at 21:32

## 2020-10-28 RX ADMIN — SODIUM CHLORIDE 125 MILLILITER(S): 9 INJECTION, SOLUTION INTRAVENOUS at 05:54

## 2020-10-28 RX ADMIN — Medication 1000 MILLIUNIT(S)/MIN: at 12:49

## 2020-10-28 RX ADMIN — Medication 975 MILLIGRAM(S): at 23:40

## 2020-10-28 RX ADMIN — Medication 975 MILLIGRAM(S): at 17:39

## 2020-10-28 RX ADMIN — Medication 600 MILLIGRAM(S): at 20:32

## 2020-10-28 RX ADMIN — Medication 4 MILLIUNIT(S)/MIN: at 06:19

## 2020-10-28 RX ADMIN — Medication 975 MILLIGRAM(S): at 18:20

## 2020-10-28 RX ADMIN — Medication 30 MILLIGRAM(S): at 14:05

## 2020-10-29 ENCOUNTER — TRANSCRIPTION ENCOUNTER (OUTPATIENT)
Age: 30
End: 2020-10-29

## 2020-10-29 VITALS
RESPIRATION RATE: 18 BRPM | HEART RATE: 67 BPM | SYSTOLIC BLOOD PRESSURE: 101 MMHG | DIASTOLIC BLOOD PRESSURE: 66 MMHG | TEMPERATURE: 98 F | OXYGEN SATURATION: 98 %

## 2020-10-29 PROCEDURE — 86901 BLOOD TYPING SEROLOGIC RH(D): CPT

## 2020-10-29 PROCEDURE — 85025 COMPLETE CBC W/AUTO DIFF WBC: CPT

## 2020-10-29 PROCEDURE — 86900 BLOOD TYPING SEROLOGIC ABO: CPT

## 2020-10-29 PROCEDURE — 59025 FETAL NON-STRESS TEST: CPT

## 2020-10-29 PROCEDURE — 86850 RBC ANTIBODY SCREEN: CPT

## 2020-10-29 PROCEDURE — 59050 FETAL MONITOR W/REPORT: CPT

## 2020-10-29 PROCEDURE — 86780 TREPONEMA PALLIDUM: CPT

## 2020-10-29 PROCEDURE — 86769 SARS-COV-2 COVID-19 ANTIBODY: CPT

## 2020-10-29 RX ORDER — ACETAMINOPHEN 500 MG
3 TABLET ORAL
Qty: 0 | Refills: 0 | DISCHARGE
Start: 2020-10-29

## 2020-10-29 RX ORDER — IBUPROFEN 200 MG
1 TABLET ORAL
Qty: 0 | Refills: 0 | DISCHARGE
Start: 2020-10-29

## 2020-10-29 RX ADMIN — Medication 600 MILLIGRAM(S): at 16:19

## 2020-10-29 RX ADMIN — Medication 975 MILLIGRAM(S): at 05:37

## 2020-10-29 RX ADMIN — Medication 975 MILLIGRAM(S): at 13:30

## 2020-10-29 RX ADMIN — Medication 600 MILLIGRAM(S): at 03:44

## 2020-10-29 RX ADMIN — Medication 975 MILLIGRAM(S): at 12:54

## 2020-10-29 RX ADMIN — Medication 975 MILLIGRAM(S): at 00:40

## 2020-10-29 RX ADMIN — Medication 975 MILLIGRAM(S): at 06:37

## 2020-10-29 RX ADMIN — Medication 600 MILLIGRAM(S): at 04:44

## 2020-10-29 RX ADMIN — Medication 600 MILLIGRAM(S): at 09:03

## 2020-10-29 NOTE — DISCHARGE NOTE OB - PATIENT PORTAL LINK FT
You can access the FollowMyHealth Patient Portal offered by [facility] by registering at the following website: [email or web address]. By joining [employer]’s FollowMyHealth portal, you will also be able to view your health information using other applications (apps) compatible with our system.

## 2020-10-29 NOTE — PROGRESS NOTE ADULT - ASSESSMENT
A/P:  30y  PPD # 1      S/P                       doing well    PMHx: s/p rhinoplasty  Current Issues: none

## 2020-10-29 NOTE — DISCHARGE NOTE OB - MEDICATION SUMMARY - MEDICATIONS TO TAKE
I will START or STAY ON the medications listed below when I get home from the hospital:    acetaminophen 325 mg oral tablet  -- 3 tab(s) by mouth   -- Indication: For mild pain    ibuprofen 600 mg oral tablet  -- 1 tab(s) by mouth every 6 hours  -- Indication: For moderate pain    Prenatal Multivitamins with Folic Acid 1 mg oral tablet  -- 1 tab(s) by mouth once a day  -- Indication: For Postpartum

## 2020-10-29 NOTE — DISCHARGE NOTE OB - CARE PROVIDER_API CALL
Leeroy Villanueva AND [address] [place] [id number]  Phone: [phone]  Fax: [phone]  Follow Up Time:

## 2020-12-02 ENCOUNTER — RESULT REVIEW (OUTPATIENT)
Age: 30
End: 2020-12-02

## 2020-12-24 ENCOUNTER — TRANSCRIPTION ENCOUNTER (OUTPATIENT)
Age: 30
End: 2020-12-24

## 2020-12-28 ENCOUNTER — TRANSCRIPTION ENCOUNTER (OUTPATIENT)
Age: 30
End: 2020-12-28

## 2021-02-25 ENCOUNTER — TRANSCRIPTION ENCOUNTER (OUTPATIENT)
Age: 31
End: 2021-02-25

## 2021-04-27 ENCOUNTER — APPOINTMENT (OUTPATIENT)
Dept: OBGYN | Facility: CLINIC | Age: 31
End: 2021-04-27
Payer: COMMERCIAL

## 2021-04-27 VITALS — DIASTOLIC BLOOD PRESSURE: 70 MMHG | SYSTOLIC BLOOD PRESSURE: 110 MMHG

## 2021-04-27 DIAGNOSIS — N64.4 MASTODYNIA: ICD-10-CM

## 2021-04-27 DIAGNOSIS — R87.615 UNSATISFACTORY CYTOLOGIC SMEAR OF CERVIX: ICD-10-CM

## 2021-04-27 PROCEDURE — 99213 OFFICE O/P EST LOW 20 MIN: CPT

## 2021-04-27 PROCEDURE — 99072 ADDL SUPL MATRL&STAF TM PHE: CPT

## 2021-04-27 RX ORDER — NAPROXEN 500 MG/1
500 TABLET ORAL
Qty: 30 | Refills: 2 | Status: ACTIVE | COMMUNITY
Start: 2021-04-27 | End: 1900-01-01

## 2021-04-28 LAB — HPV HIGH+LOW RISK DNA PNL CVX: NOT DETECTED

## 2021-05-02 LAB — CYTOLOGY CVX/VAG DOC THIN PREP: NORMAL

## 2021-05-13 ENCOUNTER — LABORATORY RESULT (OUTPATIENT)
Age: 31
End: 2021-05-13

## 2021-05-13 ENCOUNTER — APPOINTMENT (OUTPATIENT)
Dept: DERMATOLOGY | Facility: CLINIC | Age: 31
End: 2021-05-13
Payer: COMMERCIAL

## 2021-05-13 ENCOUNTER — NON-APPOINTMENT (OUTPATIENT)
Age: 31
End: 2021-05-13

## 2021-05-13 VITALS — BODY MASS INDEX: 17.64 KG/M2 | HEIGHT: 71 IN | WEIGHT: 126 LBS

## 2021-05-13 DIAGNOSIS — D48.9 NEOPLASM OF UNCERTAIN BEHAVIOR, UNSPECIFIED: ICD-10-CM

## 2021-05-13 DIAGNOSIS — D22.9 MELANOCYTIC NEVI, UNSPECIFIED: ICD-10-CM

## 2021-05-13 DIAGNOSIS — L70.0 ACNE VULGARIS: ICD-10-CM

## 2021-05-13 DIAGNOSIS — L72.3 SEBACEOUS CYST: ICD-10-CM

## 2021-05-13 PROCEDURE — 99203 OFFICE O/P NEW LOW 30 MIN: CPT | Mod: 25

## 2021-05-13 PROCEDURE — 11900 INJECT SKIN LESIONS </W 7: CPT | Mod: 59

## 2021-05-13 PROCEDURE — 99072 ADDL SUPL MATRL&STAF TM PHE: CPT

## 2021-05-13 PROCEDURE — 11102 TANGNTL BX SKIN SINGLE LES: CPT | Mod: 59

## 2021-05-13 NOTE — PHYSICAL EXAM
[FreeTextEntry3] : AAOx3, pleasant, NAD, no visual lymphadenopathy\par hair, scalp, face, nose, eyelids, ears, lips, oropharynx, neck, chest, abdomen, back, right arm, left arm, nails, and hands examined with all normal findings,\par pertinent findings include:\par \par cystic nodules on face; inflamed on glabella\par brown plaque on left scalp\par multiple benign nevi and lentigines\par

## 2021-05-13 NOTE — ASSESSMENT
[FreeTextEntry1] : 1) benign findings as above- education\par \par 2) acne\par education\par discussed spironolactone; SED\par patient defers\par \par 3) inflamed cyst\par Risks and benefits were discussed including including atrophy, discoloration\par Intralesional triamcinolone, concentration  2.5 mg/cc;\par Total volume   0.1   cc\par Sites: 1\par \par 4) Shave bx location left scalp\par diagnosis: r/o DN\par  \par Shave biopsy performed today over above location, risks and benefits discussed including incomplete removal, not enough tissue for diagnosis scarring and infection, informed consent obtained, pictures taken,  cleaned with alcohol and anesthetized with 1%lido+epi, 0.3 cc total, hemostasis obtained with monsels, vaseline and bandaid placed, tolerated well, wound care reviewed, specimen sent to pathology.\par \par

## 2021-05-13 NOTE — HISTORY OF PRESENT ILLNESS
[FreeTextEntry1] : cysts, mole [de-identified] : 31 year old female with cysts and mole. cysts are painful. worst on glabella. has mole on left scalp and on torso.

## 2021-05-21 ENCOUNTER — RESULT REVIEW (OUTPATIENT)
Age: 31
End: 2021-05-21

## 2021-05-21 ENCOUNTER — APPOINTMENT (OUTPATIENT)
Dept: ULTRASOUND IMAGING | Facility: CLINIC | Age: 31
End: 2021-05-21
Payer: COMMERCIAL

## 2021-05-21 PROCEDURE — 76642 ULTRASOUND BREAST LIMITED: CPT | Mod: RT

## 2021-05-27 ENCOUNTER — APPOINTMENT (OUTPATIENT)
Dept: OBGYN | Facility: CLINIC | Age: 31
End: 2021-05-27
Payer: COMMERCIAL

## 2021-05-27 ENCOUNTER — ASOB RESULT (OUTPATIENT)
Age: 31
End: 2021-05-27

## 2021-05-27 VITALS — SYSTOLIC BLOOD PRESSURE: 110 MMHG | DIASTOLIC BLOOD PRESSURE: 70 MMHG

## 2021-05-27 DIAGNOSIS — N64.4 MASTODYNIA: ICD-10-CM

## 2021-05-27 DIAGNOSIS — M94.0 CHONDROCOSTAL JUNCTION SYNDROME [TIETZE]: ICD-10-CM

## 2021-05-27 PROCEDURE — 76830 TRANSVAGINAL US NON-OB: CPT

## 2021-05-27 PROCEDURE — 99072 ADDL SUPL MATRL&STAF TM PHE: CPT

## 2021-05-27 PROCEDURE — 99214 OFFICE O/P EST MOD 30 MIN: CPT | Mod: 25

## 2021-05-28 ENCOUNTER — NON-APPOINTMENT (OUTPATIENT)
Age: 31
End: 2021-05-28

## 2021-08-24 NOTE — PROVIDER CONTACT NOTE (OTHER) - RECOMMENDATIONS
General: Endorses fever, chills  HEENT: Denies sensory changes, sore throat  Neck: Denies neck pain, neck stiffness  Resp: Denies coughing, SOB  Cardiovascular: Denies CP, palpitations, LE edema  GI: Denies nausea, vomiting, abdominal pain, diarrhea, constipation, blood in stool  : Denies dysuria, hematuria, frequency, incontinence  MSK: Denies back pain, +L leg pain   Neuro: Denies HA, dizziness, numbness, weakness  Skin: Denies rashes.
notify MDs

## 2021-10-22 ENCOUNTER — NON-APPOINTMENT (OUTPATIENT)
Age: 31
End: 2021-10-22

## 2021-10-22 ENCOUNTER — APPOINTMENT (OUTPATIENT)
Dept: OPHTHALMOLOGY | Facility: CLINIC | Age: 31
End: 2021-10-22
Payer: COMMERCIAL

## 2021-10-22 PROCEDURE — 99204 OFFICE O/P NEW MOD 45 MIN: CPT

## 2021-10-22 PROCEDURE — 92083 EXTENDED VISUAL FIELD XM: CPT

## 2022-01-28 ENCOUNTER — EMERGENCY (EMERGENCY)
Facility: HOSPITAL | Age: 32
LOS: 1 days | Discharge: HOME | End: 2022-01-28
Attending: EMERGENCY MEDICINE
Payer: COMMERCIAL

## 2022-01-28 ENCOUNTER — NON-APPOINTMENT (OUTPATIENT)
Age: 32
End: 2022-01-28

## 2022-01-28 VITALS
TEMPERATURE: 98 F | RESPIRATION RATE: 18 BRPM | OXYGEN SATURATION: 100 % | DIASTOLIC BLOOD PRESSURE: 77 MMHG | SYSTOLIC BLOOD PRESSURE: 112 MMHG | WEIGHT: 128.09 LBS | HEIGHT: 71 IN | HEART RATE: 87 BPM

## 2022-01-28 LAB
ALBUMIN SERPL ELPH-MCNC: 4.9 G/DL — SIGNIFICANT CHANGE UP (ref 3.3–5)
ALP SERPL-CCNC: 48 U/L — SIGNIFICANT CHANGE UP (ref 40–120)
ALT FLD-CCNC: 10 U/L — SIGNIFICANT CHANGE UP (ref 10–45)
ANION GAP SERPL CALC-SCNC: 11 MMOL/L — SIGNIFICANT CHANGE UP (ref 5–17)
APPEARANCE UR: CLEAR — SIGNIFICANT CHANGE UP
AST SERPL-CCNC: 15 U/L — SIGNIFICANT CHANGE UP (ref 10–40)
BASOPHILS # BLD AUTO: 0.04 K/UL — SIGNIFICANT CHANGE UP (ref 0–0.2)
BASOPHILS NFR BLD AUTO: 0.5 % — SIGNIFICANT CHANGE UP (ref 0–2)
BILIRUB SERPL-MCNC: 0.2 MG/DL — SIGNIFICANT CHANGE UP (ref 0.2–1.2)
BILIRUB UR-MCNC: NEGATIVE — SIGNIFICANT CHANGE UP
BLD GP AB SCN SERPL QL: NEGATIVE — SIGNIFICANT CHANGE UP
BUN SERPL-MCNC: 19 MG/DL — SIGNIFICANT CHANGE UP (ref 7–23)
CALCIUM SERPL-MCNC: 9.6 MG/DL — SIGNIFICANT CHANGE UP (ref 8.4–10.5)
CHLORIDE SERPL-SCNC: 104 MMOL/L — SIGNIFICANT CHANGE UP (ref 96–108)
CO2 SERPL-SCNC: 23 MMOL/L — SIGNIFICANT CHANGE UP (ref 22–31)
COLOR SPEC: YELLOW — SIGNIFICANT CHANGE UP
CREAT SERPL-MCNC: 0.92 MG/DL — SIGNIFICANT CHANGE UP (ref 0.5–1.3)
DIFF PNL FLD: NEGATIVE — SIGNIFICANT CHANGE UP
EOSINOPHIL # BLD AUTO: 0.2 K/UL — SIGNIFICANT CHANGE UP (ref 0–0.5)
EOSINOPHIL NFR BLD AUTO: 2.7 % — SIGNIFICANT CHANGE UP (ref 0–6)
GLUCOSE SERPL-MCNC: 95 MG/DL — SIGNIFICANT CHANGE UP (ref 70–99)
GLUCOSE UR QL: NEGATIVE — SIGNIFICANT CHANGE UP
HCG SERPL-ACNC: 544.4 MIU/ML — HIGH
HCT VFR BLD CALC: 40.9 % — SIGNIFICANT CHANGE UP (ref 34.5–45)
HGB BLD-MCNC: 13.3 G/DL — SIGNIFICANT CHANGE UP (ref 11.5–15.5)
IMM GRANULOCYTES NFR BLD AUTO: 0.3 % — SIGNIFICANT CHANGE UP (ref 0–1.5)
KETONES UR-MCNC: ABNORMAL
LEUKOCYTE ESTERASE UR-ACNC: NEGATIVE — SIGNIFICANT CHANGE UP
LYMPHOCYTES # BLD AUTO: 2.7 K/UL — SIGNIFICANT CHANGE UP (ref 1–3.3)
LYMPHOCYTES # BLD AUTO: 36.7 % — SIGNIFICANT CHANGE UP (ref 13–44)
MCHC RBC-ENTMCNC: 29.6 PG — SIGNIFICANT CHANGE UP (ref 27–34)
MCHC RBC-ENTMCNC: 32.5 GM/DL — SIGNIFICANT CHANGE UP (ref 32–36)
MCV RBC AUTO: 90.9 FL — SIGNIFICANT CHANGE UP (ref 80–100)
MONOCYTES # BLD AUTO: 0.34 K/UL — SIGNIFICANT CHANGE UP (ref 0–0.9)
MONOCYTES NFR BLD AUTO: 4.6 % — SIGNIFICANT CHANGE UP (ref 2–14)
NEUTROPHILS # BLD AUTO: 4.06 K/UL — SIGNIFICANT CHANGE UP (ref 1.8–7.4)
NEUTROPHILS NFR BLD AUTO: 55.2 % — SIGNIFICANT CHANGE UP (ref 43–77)
NITRITE UR-MCNC: NEGATIVE — SIGNIFICANT CHANGE UP
NRBC # BLD: 0 /100 WBCS — SIGNIFICANT CHANGE UP (ref 0–0)
PH UR: 5.5 — SIGNIFICANT CHANGE UP (ref 5–8)
PLATELET # BLD AUTO: 302 K/UL — SIGNIFICANT CHANGE UP (ref 150–400)
POTASSIUM SERPL-MCNC: 3.7 MMOL/L — SIGNIFICANT CHANGE UP (ref 3.5–5.3)
POTASSIUM SERPL-SCNC: 3.7 MMOL/L — SIGNIFICANT CHANGE UP (ref 3.5–5.3)
PROT SERPL-MCNC: 7.2 G/DL — SIGNIFICANT CHANGE UP (ref 6–8.3)
PROT UR-MCNC: SIGNIFICANT CHANGE UP
RBC # BLD: 4.5 M/UL — SIGNIFICANT CHANGE UP (ref 3.8–5.2)
RBC # FLD: 12.6 % — SIGNIFICANT CHANGE UP (ref 10.3–14.5)
RH IG SCN BLD-IMP: POSITIVE — SIGNIFICANT CHANGE UP
SODIUM SERPL-SCNC: 138 MMOL/L — SIGNIFICANT CHANGE UP (ref 135–145)
SP GR SPEC: 1.02 — SIGNIFICANT CHANGE UP (ref 1.01–1.02)
UROBILINOGEN FLD QL: NEGATIVE — SIGNIFICANT CHANGE UP
WBC # BLD: 7.36 K/UL — SIGNIFICANT CHANGE UP (ref 3.8–10.5)
WBC # FLD AUTO: 7.36 K/UL — SIGNIFICANT CHANGE UP (ref 3.8–10.5)

## 2022-01-28 PROCEDURE — 86850 RBC ANTIBODY SCREEN: CPT

## 2022-01-28 PROCEDURE — 76817 TRANSVAGINAL US OBSTETRIC: CPT | Mod: 26

## 2022-01-28 PROCEDURE — 85025 COMPLETE CBC W/AUTO DIFF WBC: CPT

## 2022-01-28 PROCEDURE — 81003 URINALYSIS AUTO W/O SCOPE: CPT

## 2022-01-28 PROCEDURE — 99284 EMERGENCY DEPT VISIT MOD MDM: CPT | Mod: 25

## 2022-01-28 PROCEDURE — 36415 COLL VENOUS BLD VENIPUNCTURE: CPT

## 2022-01-28 PROCEDURE — 86901 BLOOD TYPING SEROLOGIC RH(D): CPT

## 2022-01-28 PROCEDURE — 87086 URINE CULTURE/COLONY COUNT: CPT

## 2022-01-28 PROCEDURE — 86900 BLOOD TYPING SEROLOGIC ABO: CPT

## 2022-01-28 PROCEDURE — 93975 VASCULAR STUDY: CPT | Mod: 26

## 2022-01-28 PROCEDURE — 87186 SC STD MICRODIL/AGAR DIL: CPT

## 2022-01-28 PROCEDURE — 80053 COMPREHEN METABOLIC PANEL: CPT

## 2022-01-28 PROCEDURE — 93975 VASCULAR STUDY: CPT

## 2022-01-28 PROCEDURE — 76817 TRANSVAGINAL US OBSTETRIC: CPT

## 2022-01-28 PROCEDURE — 84702 CHORIONIC GONADOTROPIN TEST: CPT

## 2022-01-28 PROCEDURE — 99285 EMERGENCY DEPT VISIT HI MDM: CPT

## 2022-01-28 NOTE — ED PROVIDER NOTE - NSFOLLOWUPINSTRUCTIONS_ED_ALL_ED_FT
Please follow up with your primary care doctor within 1 week.  Please follow up with your OBGYN within 1 week  *Bring all printed lab/test results to your appointment(s).*    Take acetaminophen 500-1000mg every 6 hrs as needed for pain. DO NOT EXCEED 4000mg DAILY.    Rest. Stay well hydrated.    Return to the ED for worsening pain, vaginal bleeding, fevers, or any other concerns. Please follow up with your OBGYN in 48 hours for repeat beta HCG check  *Bring all printed lab/test results to your appointment(s).*    Take acetaminophen 500-1000mg every 6 hrs as needed for pain. DO NOT EXCEED 4000mg DAILY.    Rest. Stay well hydrated.    Return to the ED for worsening pain, vaginal bleeding, fevers, or any other concerns.

## 2022-01-28 NOTE — ED ADULT TRIAGE NOTE - CHIEF COMPLAINT QUOTE
[id number] pregnant (LMP 12/30/21)  abdominal cramping and LLQ abdominal pain x4days  sent in by OBGYN for US

## 2022-01-28 NOTE — ED ADULT NURSE NOTE - OBJECTIVE STATEMENT
31yr old female  currently 4 weeks pregnant, presents to ED c/o abdominal pain. Pt states she took a home pregnancy test that came back positive, and her LMP was . For the past 4 days pt has been experiencing an aching intermittent pain in the LLQ that radiates down her inner thighs. Pt has an appt with her OBGYN for her 8th week, but she called her OB tonight re: her abdominal pain and was advised to come to ED for US. Pt also endorses nausea and denies vomiting, diarrhea, vaginal bleeding, GI,  symptoms, fevers or chills. Pt assessed by MD, bed lowered and cathy, call bell within reach. will reassess

## 2022-01-28 NOTE — ED PROVIDER NOTE - PATIENT PORTAL LINK FT
You can access the FollowMyHealth Patient Portal offered by [facility] by registering at the following website: [email or web address]. By joining [employer]’s FollowMyHealth portal, you will also be able to view your health information using other applications (apps) compatible with our system.

## 2022-01-28 NOTE — ED ADULT NURSE NOTE - CHIEF COMPLAINT QUOTE
[id number] pregnant (LMP 12/30/21)  abdominal cramping and LLQ abdominal pain x4days  sent in by OBGYN for US

## 2022-01-28 NOTE — ED PROVIDER NOTE - ATTENDING CONTRIBUTION TO CARE
pt is a 32 y/o female  with left lower pelvic pain crampy 2/10 pain, deferred pain meds, no surgical issues, sent in by her ob for pain, no vag bleeding, no uti sts, fevers, spoke with ob, labs, us, ua ordered likely sign out for imaging. Deferred pelvic exam.

## 2022-01-28 NOTE — ED ADULT NURSE NOTE - NSIMPLEMENTINTERV_GEN_ALL_ED
Implemented All Fall Risk Interventions:  [place] to call system. Call bell, personal items and telephone within reach. Instruct patient to call for assistance. Room bathroom lighting operational. Non-slip footwear when patient is off stretcher. Physically safe environment: no spills, clutter or unnecessary equipment. Stretcher in lowest position, wheels locked, appropriate side rails in place. Provide visual cue, wrist band, yellow gown, etc. Monitor gait and stability. Monitor for mental status changes and reorient to person, place, and time. Review medications for side effects contributing to fall risk. Reinforce activity limits and safety measures with patient and family.

## 2022-01-28 NOTE — ED PROVIDER NOTE - PROGRESS NOTE DETAILS
Dr. House (Attending Physician)  Signed out to me. No IUP on ultrasound. HCG only 544. Will need hcg check in 48 hours. d/w ob/gyn resident.

## 2022-01-28 NOTE — ED PROVIDER NOTE - CARE PROVIDER_API CALL
Demetrice Love)  [place] [id number]  Phone: [phone]  Fax: [phone]  Follow Up Time: 1-3 Days

## 2022-01-28 NOTE — ED PROVIDER NOTE - OBJECTIVE STATEMENT
31y F  p/w abdominal pain. pt reports 4 days of LLQ aching pain, non-radiating, currently 2/10. spoke to her OBGYN's office who advised going to ED. pt is 4wks pregnant by date, LMP . no pain meds taken for pain, declining meds now. reports nausea. denies vomiting, diarrhea, urinary fx, flank pain, vaginal bleeding/discharge, CP, SOB.   OBGYN Dr. Love

## 2022-01-29 VITALS
TEMPERATURE: 98 F | HEART RATE: 76 BPM | OXYGEN SATURATION: 98 % | RESPIRATION RATE: 18 BRPM | SYSTOLIC BLOOD PRESSURE: 108 MMHG | DIASTOLIC BLOOD PRESSURE: 72 MMHG

## 2022-01-29 DIAGNOSIS — O36.80X0 PREGNANCY WITH INCONCLUSIVE FETAL VIABILITY, NOT APPLICABLE OR UNSPECIFIED: ICD-10-CM

## 2022-01-29 NOTE — CONSULT NOTE ADULT - PROBLEM SELECTOR RECOMMENDATION 9
Plan  - TVUS reviewed in detail, no IUP or extrauterine gestation, c/w PUL  - Patient stable for discharge home  - Patient instructed to call OB office to schedule f/u appointment on Monday, 1/31  - Emergency return precautions discussed in detail    d/w Dr. Denise Coppola PGY2 Plan  - TVUS reviewed in detail, no IUP or extrauterine gestation, c/w PUL  - Rh+, rhogam not indicated  - Patient stable for discharge home  - Patient instructed to call OB office to schedule f/u appointment on Monday, 1/31  - Emergency return precautions discussed in detail    d/w Dr. Denise Coppola PGY2

## 2022-01-29 NOTE — CONSULT NOTE ADULT - ASSESSMENT
30yo  @4w1d by LMP 21 p/w abdominal pain x1 week. Patient denies pain at bedside, and has not required analgesics to manage her pain symptoms. Patient hemodynamically and clinically stable.

## 2022-01-29 NOTE — CONSULT NOTE ADULT - SUBJECTIVE AND OBJECTIVE BOX
VANNA SEXTON  31y  Female [id number]    HPI: 32yo  @4w1d by LMP 21 p/w abdominal pain x1 week. Patient reports that pain is localized to LLQ, is intermittent, lasts seconds, and spontaneously resolves. She also has concomitant bilateral shooting pains down her legs when she experiences this sharp abdominal pain. She has not taken any analgesics for her pain, nor has she required any analgesics during her stay in the ED. She denies vaginal bleeding, abnormal vaginal discharge, fevers, chills, chest pain, SOB, vomiting, diarrhea, constipation or urinary complaints. Endorses mild nausea, but reports having a normal appetite and tolerating PO.     Name of GYN Physician: Dr. Love    POB:           Pgyn: Denies fibroids, cysts, endometriosis, STI's, Abnormal pap smears     Home meds: Denies    Allergies  Ceclor (Hives)  sulfa drugs (Other)    PAST MEDICAL & SURGICAL HISTORY:  No pertinent past medical history  No significant past surgical history    Social History:  Denies smoking use, drug use, alcohol use.      Vital Signs Last 24 Hrs  T(C): 36.6 (2022 00:26), Max: 36.8 (2022 19:53)  T(F): 97.9 (2022 00:26), Max: 98.3 (2022 19:53)  HR: 75 (2022 00:26) (75 - 87)  BP: 111/63 (2022 00:26) (111/63 - 119/76)  RR: 19 (2022 00:26) (18 - 19)  SpO2: 100% (2022 00:26) (100% - 100%)    Physical Exam:   General: sitting comfortably in bed, NAD   CV: RR S1S2 no m/r/g  Lungs: CTA b/l, good air flow b/l   Abd: Soft, non-tender, non-distended.  Bowel sounds present. No rebound tenderness or guarding.  : No bleeding on pad. External labia wnl. Bimanual exam with no cervical motion tenderness, uterus wnl, adnexa non palpable b/l.  Cervix closed.  Ext: non-tender b/l, no edema     LABS:  b-hC.4    T&S: A+               13.3   7.36  )-----------( 302      ( 2022 22:17 )             40.9         138  |  104  |  19  ----------------------------<  95  3.7   |  23  |  0.92    Ca    9.6      2022 22:17    TPro  7.2  /  Alb  4.9  /  TBili  0.2  /  DBili  x   /  AST  15  /  ALT  10  /  AlkPhos  48      Urinalysis Basic - ( 2022 22:56 )  Color: Yellow / Appearance: Clear / S.024 / pH: x  Gluc: x / Ketone: Small  / Bili: Negative / Urobili: Negative   Blood: x / Protein: Trace / Nitrite: Negative   Leuk Esterase: Negative / RBC: x / WBC x   Sq Epi: x / Non Sq Epi: x / Bacteria: x    RADIOLOGY & ADDITIONAL STUDIES:  FINDINGS:  Uterus and endometrium: 7.8 x 4.9 x 6.2 cm. No intrauterine gestational   sac. Mildly thickened endometrium measuring up to 2.1 cm.    Right ovary: 2.1 cm x 1.2 cm x 2.0 cm. Within normal limits. Arterial and   venous waveforms documented.  Left ovary: 3.2 cm x 2.5 cm x 3.1 cm. Corpus luteal cyst measuring up to   2.4 cm. Arterial and venous waveforms documented.    Other: No suspicious adnexal mass.    Fluid: Trace pelvic free fluid.    IMPRESSION:    Pregnancy of unknown origin. Recommend serial beta hCG and short interval   follow-up sonogram.

## 2022-01-31 ENCOUNTER — APPOINTMENT (OUTPATIENT)
Dept: OBGYN | Facility: CLINIC | Age: 32
End: 2022-01-31
Payer: COMMERCIAL

## 2022-01-31 VITALS — DIASTOLIC BLOOD PRESSURE: 76 MMHG | SYSTOLIC BLOOD PRESSURE: 122 MMHG

## 2022-01-31 DIAGNOSIS — O23.40 UNSPECIFIED INFECTION OF URINARY TRACT IN PREGNANCY, UNSPECIFIED TRIMESTER: ICD-10-CM

## 2022-01-31 PROBLEM — Z78.9 OTHER SPECIFIED HEALTH STATUS: Chronic | Status: ACTIVE | Noted: 2022-01-28

## 2022-01-31 LAB
-  AMPICILLIN: SIGNIFICANT CHANGE UP
-  CIPROFLOXACIN: SIGNIFICANT CHANGE UP
-  LEVOFLOXACIN: SIGNIFICANT CHANGE UP
-  NITROFURANTOIN: SIGNIFICANT CHANGE UP
-  TETRACYCLINE: SIGNIFICANT CHANGE UP
-  VANCOMYCIN: SIGNIFICANT CHANGE UP
CULTURE RESULTS: SIGNIFICANT CHANGE UP
METHOD TYPE: SIGNIFICANT CHANGE UP
ORGANISM # SPEC MICROSCOPIC CNT: SIGNIFICANT CHANGE UP
ORGANISM # SPEC MICROSCOPIC CNT: SIGNIFICANT CHANGE UP
PROGEST SERPL-MCNC: 35.4 NG/ML
SPECIMEN SOURCE: SIGNIFICANT CHANGE UP

## 2022-01-31 PROCEDURE — 36415 COLL VENOUS BLD VENIPUNCTURE: CPT

## 2022-01-31 PROCEDURE — 99213 OFFICE O/P EST LOW 20 MIN: CPT | Mod: 25

## 2022-01-31 RX ORDER — NITROFURANTOIN MACROCRYSTAL 50 MG
1 CAPSULE ORAL
Qty: 10 | Refills: 0
Start: 2022-01-31 | End: 2022-02-04

## 2022-01-31 NOTE — ED POST DISCHARGE NOTE - DETAILS
1/31/22: Pt contacted, reports urinary frequency w/pregnancy but denies dysuria/f/c. Would treat for asx bactiuria in pregnancy. course of macrobid sent to pharmacy. pt has followup with OBGYN today. return precautions given. all questions answered - Jose Davis PA-C

## 2022-02-01 LAB — HCG SERPL-MCNC: 1860 MIU/ML

## 2022-02-01 RX ORDER — ASCORBIC ACID, CHOLECALCIFEROL, .ALPHA.-TOCOPHEROL ACETATE, DL-, PYRIDOXINE HYDROCHLORIDE, FOLIC ACID, CYANOCOBALAMIN, BIOTIN, CALCIUM CARBONATE, FERROUS ASPARTO GLYCINATE, IRON, POTASSIUM IODIDE, MAGNESIUM OXIDE, DOCONEXENT AND LOWBUSH BLUEBERRY 60; 1000; 10; 26; 400; 13; 280; 80; 9; 9; 150; 25; 350; 25; 600 MG/1; [IU]/1; [IU]/1; MG/1; UG/1; UG/1; UG/1; MG/1; MG/1; MG/1; UG/1; MG/1; MG/1; MG/1; UG/1
18-0.6-0.4-35 CAPSULE, GELATIN COATED ORAL
Qty: 3 | Refills: 2 | Status: ACTIVE | COMMUNITY
Start: 2022-02-01 | End: 1900-01-01

## 2022-02-03 ENCOUNTER — APPOINTMENT (OUTPATIENT)
Dept: OBGYN | Facility: CLINIC | Age: 32
End: 2022-02-03
Payer: COMMERCIAL

## 2022-02-03 ENCOUNTER — ASOB RESULT (OUTPATIENT)
Age: 32
End: 2022-02-03

## 2022-02-03 VITALS
DIASTOLIC BLOOD PRESSURE: 76 MMHG | SYSTOLIC BLOOD PRESSURE: 124 MMHG | WEIGHT: 131 LBS | BODY MASS INDEX: 18.34 KG/M2 | HEIGHT: 71 IN

## 2022-02-03 DIAGNOSIS — Z34.90 ENCOUNTER FOR SUPERVISION OF NORMAL PREGNANCY, UNSPECIFIED, UNSPECIFIED TRIMESTER: ICD-10-CM

## 2022-02-03 PROCEDURE — 76801 OB US < 14 WKS SINGLE FETUS: CPT

## 2022-02-03 PROCEDURE — 99213 OFFICE O/P EST LOW 20 MIN: CPT | Mod: 25

## 2022-02-16 ENCOUNTER — NON-APPOINTMENT (OUTPATIENT)
Age: 32
End: 2022-02-16

## 2022-02-17 ENCOUNTER — APPOINTMENT (OUTPATIENT)
Dept: OBGYN | Facility: CLINIC | Age: 32
End: 2022-02-17
Payer: COMMERCIAL

## 2022-02-17 VITALS — SYSTOLIC BLOOD PRESSURE: 110 MMHG | DIASTOLIC BLOOD PRESSURE: 70 MMHG

## 2022-02-17 DIAGNOSIS — R39.9 UNSPECIFIED SYMPTOMS AND SIGNS INVOLVING THE GENITOURINARY SYSTEM: ICD-10-CM

## 2022-02-17 LAB
BILIRUB UR QL STRIP: NORMAL
CLARITY UR: CLEAR
COLLECTION METHOD: NORMAL
GLUCOSE UR-MCNC: NORMAL
HCG UR QL: 0.2 EU/DL
HGB UR QL STRIP.AUTO: NORMAL
KETONES UR-MCNC: NORMAL
LEUKOCYTE ESTERASE UR QL STRIP: NORMAL
NITRITE UR QL STRIP: NORMAL
PH UR STRIP: 5
PROT UR STRIP-MCNC: NORMAL
SP GR UR STRIP: 1.02

## 2022-02-17 PROCEDURE — 99213 OFFICE O/P EST LOW 20 MIN: CPT | Mod: 25

## 2022-02-17 PROCEDURE — 81002 URINALYSIS NONAUTO W/O SCOPE: CPT

## 2022-02-21 RX ORDER — NITROFURANTOIN (MONOHYDRATE/MACROCRYSTALS) 25; 75 MG/1; MG/1
100 CAPSULE ORAL TWICE DAILY
Qty: 10 | Refills: 0 | Status: ACTIVE | COMMUNITY
Start: 2022-02-21 | End: 1900-01-01

## 2022-02-22 LAB — BACTERIA UR CULT: ABNORMAL

## 2022-02-23 ENCOUNTER — APPOINTMENT (OUTPATIENT)
Dept: OBGYN | Facility: CLINIC | Age: 32
End: 2022-02-23
Payer: COMMERCIAL

## 2022-02-23 ENCOUNTER — ASOB RESULT (OUTPATIENT)
Age: 32
End: 2022-02-23

## 2022-02-23 VITALS — DIASTOLIC BLOOD PRESSURE: 63 MMHG | BODY MASS INDEX: 17.99 KG/M2 | WEIGHT: 129 LBS | SYSTOLIC BLOOD PRESSURE: 103 MMHG

## 2022-02-23 DIAGNOSIS — O23.40 UNSPECIFIED INFECTION OF URINARY TRACT IN PREGNANCY, UNSPECIFIED TRIMESTER: ICD-10-CM

## 2022-02-23 DIAGNOSIS — O20.9 HEMORRHAGE IN EARLY PREGNANCY, UNSPECIFIED: ICD-10-CM

## 2022-02-23 PROCEDURE — 99213 OFFICE O/P EST LOW 20 MIN: CPT | Mod: 25

## 2022-02-23 PROCEDURE — 76801 OB US < 14 WKS SINGLE FETUS: CPT

## 2022-02-23 RX ORDER — AMPICILLIN 500 MG/1
500 CAPSULE ORAL 3 TIMES DAILY
Qty: 30 | Refills: 0 | Status: ACTIVE | COMMUNITY
Start: 2022-02-23 | End: 1900-01-01

## 2022-02-23 RX ORDER — FLUCONAZOLE 150 MG/1
150 TABLET ORAL
Qty: 2 | Refills: 0 | Status: ACTIVE | COMMUNITY
Start: 2022-02-23 | End: 1900-01-01

## 2022-02-23 NOTE — HISTORY OF PRESENT ILLNESS
[FreeTextEntry1] : 32yo  at 7+6 for problem visit - ongoing UTI, has been on macrobid 2nd course, day 2 with no resolution, is now complaining of increased pelvic pain and back pain right side > left. Some chills no documented fever. Does not have dysuria. \par \par \par SONO: aga 7+6 viable iup EDC 10/6/22

## 2022-02-23 NOTE — DISCUSSION/SUMMARY
[FreeTextEntry1] : UTI - concern for progressing to pyelo, not sever at this time. \par Uclx pos for enterocaucus - change abx to ampicillin TID. x 10 days\par Pyelo precautions given. \par RTO monday for new ob visit.

## 2022-02-28 ENCOUNTER — APPOINTMENT (OUTPATIENT)
Dept: OBGYN | Facility: CLINIC | Age: 32
End: 2022-02-28
Payer: COMMERCIAL

## 2022-02-28 VITALS
DIASTOLIC BLOOD PRESSURE: 71 MMHG | HEIGHT: 71 IN | BODY MASS INDEX: 17.92 KG/M2 | WEIGHT: 128 LBS | SYSTOLIC BLOOD PRESSURE: 125 MMHG

## 2022-02-28 VITALS — HEIGHT: 71 IN

## 2022-02-28 DIAGNOSIS — N91.2 AMENORRHEA, UNSPECIFIED: ICD-10-CM

## 2022-02-28 PROCEDURE — 0500F INITIAL PRENATAL CARE VISIT: CPT

## 2022-02-28 PROCEDURE — 36415 COLL VENOUS BLD VENIPUNCTURE: CPT

## 2022-03-01 LAB
ABO + RH PNL BLD: NORMAL
ALBUMIN SERPL ELPH-MCNC: 5 G/DL
ALP BLD-CCNC: 48 U/L
ALT SERPL-CCNC: 11 U/L
ANION GAP SERPL CALC-SCNC: 15 MMOL/L
AST SERPL-CCNC: 17 U/L
BASOPHILS # BLD AUTO: 0.05 K/UL
BASOPHILS NFR BLD AUTO: 0.4 %
BILIRUB SERPL-MCNC: 0.2 MG/DL
BLD GP AB SCN SERPL QL: NORMAL
BUN SERPL-MCNC: 11 MG/DL
CALCIUM SERPL-MCNC: 9.7 MG/DL
CHLORIDE SERPL-SCNC: 98 MMOL/L
CO2 SERPL-SCNC: 23 MMOL/L
CREAT SERPL-MCNC: 0.68 MG/DL
EGFR: 120 ML/MIN/1.73M2
EOSINOPHIL # BLD AUTO: 0.07 K/UL
EOSINOPHIL NFR BLD AUTO: 0.6 %
GLUCOSE SERPL-MCNC: 51 MG/DL
HCT VFR BLD CALC: 39.9 %
HGB BLD-MCNC: 13.1 G/DL
IMM GRANULOCYTES NFR BLD AUTO: 0.3 %
LYMPHOCYTES # BLD AUTO: 2.21 K/UL
LYMPHOCYTES NFR BLD AUTO: 18.4 %
MAN DIFF?: NORMAL
MCHC RBC-ENTMCNC: 30.3 PG
MCHC RBC-ENTMCNC: 32.8 GM/DL
MCV RBC AUTO: 92.1 FL
MONOCYTES # BLD AUTO: 0.49 K/UL
MONOCYTES NFR BLD AUTO: 4.1 %
NEUTROPHILS # BLD AUTO: 9.12 K/UL
NEUTROPHILS NFR BLD AUTO: 76.2 %
PLATELET # BLD AUTO: 396 K/UL
POTASSIUM SERPL-SCNC: 4.1 MMOL/L
PROT SERPL-MCNC: 7.4 G/DL
RBC # BLD: 4.33 M/UL
RBC # FLD: 12.3 %
SODIUM SERPL-SCNC: 137 MMOL/L
T PALLIDUM AB SER QL IA: NEGATIVE
T4 FREE SERPL-MCNC: 1.2 NG/DL
TSH SERPL-ACNC: 0.65 UIU/ML
URATE SERPL-MCNC: 2.3 MG/DL
WBC # FLD AUTO: 11.98 K/UL

## 2022-03-03 LAB
B19V IGG SER QL IA: 6.16 INDEX
B19V IGG+IGM SER-IMP: NORMAL
B19V IGG+IGM SER-IMP: POSITIVE
B19V IGM FLD-ACNC: 0.1 INDEX
B19V IGM SER-ACNC: NEGATIVE
BACTERIA UR CULT: NORMAL
C TRACH RRNA SPEC QL NAA+PROBE: NOT DETECTED
HBV SURFACE AG SER QL: NONREACTIVE
HGB A MFR BLD: 97.7 %
HGB A2 MFR BLD: 2.3 %
HGB FRACT BLD-IMP: NORMAL
HIV1+2 AB SPEC QL IA.RAPID: NONREACTIVE
HPV HIGH+LOW RISK DNA PNL CVX: NOT DETECTED
LEAD BLD-MCNC: <1 UG/DL
MEV IGG FLD QL IA: >300 AU/ML
MEV IGG+IGM SER-IMP: POSITIVE
N GONORRHOEA RRNA SPEC QL NAA+PROBE: NOT DETECTED
RUBV IGG FLD-ACNC: 5.8 INDEX
RUBV IGG SER-IMP: POSITIVE
SOURCE AMPLIFICATION: NORMAL
T GONDII AB SER-IMP: NEGATIVE
T GONDII AB SER-IMP: NEGATIVE
T GONDII IGG SER QL: <3 IU/ML
T GONDII IGM SER QL: <3 AU/ML
VZV AB TITR SER: POSITIVE
VZV IGG SER IF-ACNC: 449.9 INDEX

## 2022-03-07 ENCOUNTER — APPOINTMENT (OUTPATIENT)
Dept: OBGYN | Facility: CLINIC | Age: 32
End: 2022-03-07

## 2022-03-07 ENCOUNTER — APPOINTMENT (OUTPATIENT)
Dept: OBGYN | Facility: CLINIC | Age: 32
End: 2022-03-07
Payer: COMMERCIAL

## 2022-03-07 LAB — CYTOLOGY CVX/VAG DOC THIN PREP: NORMAL

## 2022-03-07 PROCEDURE — 36415 COLL VENOUS BLD VENIPUNCTURE: CPT

## 2022-03-08 LAB
APPEARANCE: CLEAR
BACTERIA UR CULT: NORMAL
BACTERIA: NEGATIVE
BILIRUBIN URINE: NEGATIVE
BLOOD URINE: NEGATIVE
COLOR: COLORLESS
GLUCOSE QUALITATIVE U: NEGATIVE
HYALINE CASTS: 0 /LPF
KETONES URINE: NEGATIVE
LEUKOCYTE ESTERASE URINE: NEGATIVE
MICROSCOPIC-UA: NORMAL
NITRITE URINE: NEGATIVE
PH URINE: 6
PROTEIN URINE: NEGATIVE
RED BLOOD CELLS URINE: 0 /HPF
SPECIFIC GRAVITY URINE: 1
SQUAMOUS EPITHELIAL CELLS: 0 /HPF
UROBILINOGEN URINE: NORMAL
WHITE BLOOD CELLS URINE: 0 /HPF

## 2022-03-14 ENCOUNTER — APPOINTMENT (OUTPATIENT)
Dept: OBGYN | Facility: CLINIC | Age: 32
End: 2022-03-14
Payer: COMMERCIAL

## 2022-03-14 VITALS
SYSTOLIC BLOOD PRESSURE: 116 MMHG | BODY MASS INDEX: 17.92 KG/M2 | HEIGHT: 71 IN | DIASTOLIC BLOOD PRESSURE: 72 MMHG | WEIGHT: 128 LBS

## 2022-03-14 DIAGNOSIS — Z3A.10 10 WEEKS GESTATION OF PREGNANCY: ICD-10-CM

## 2022-03-14 PROCEDURE — 36415 COLL VENOUS BLD VENIPUNCTURE: CPT

## 2022-03-14 PROCEDURE — 0502F SUBSEQUENT PRENATAL CARE: CPT

## 2022-03-24 ENCOUNTER — APPOINTMENT (OUTPATIENT)
Dept: OBGYN | Facility: CLINIC | Age: 32
End: 2022-03-24
Payer: COMMERCIAL

## 2022-03-24 ENCOUNTER — ASOB RESULT (OUTPATIENT)
Age: 32
End: 2022-03-24

## 2022-03-24 VITALS
BODY MASS INDEX: 17.92 KG/M2 | SYSTOLIC BLOOD PRESSURE: 118 MMHG | HEIGHT: 71 IN | DIASTOLIC BLOOD PRESSURE: 64 MMHG | WEIGHT: 128 LBS

## 2022-03-24 DIAGNOSIS — Z3A.12 12 WEEKS GESTATION OF PREGNANCY: ICD-10-CM

## 2022-03-24 PROCEDURE — 0502F SUBSEQUENT PRENATAL CARE: CPT

## 2022-03-24 PROCEDURE — 76813 OB US NUCHAL MEAS 1 GEST: CPT

## 2022-03-24 PROCEDURE — 36415 COLL VENOUS BLD VENIPUNCTURE: CPT

## 2022-03-29 LAB
1ST TRIMESTER DATA: NORMAL
ADDENDUM DOC: NORMAL
AFP PNL SERPL: NORMAL
AFP SERPL-ACNC: NORMAL
CLINICAL BIOCHEMIST REVIEW: NORMAL
FREE BETA HCG 1ST TRIMESTER: NORMAL
Lab: NORMAL
NOTES NTD: NORMAL
NT: NORMAL
PAPP-A SERPL-ACNC: NORMAL
TRISOMY 18/3: NORMAL

## 2022-04-21 ENCOUNTER — APPOINTMENT (OUTPATIENT)
Dept: OBGYN | Facility: CLINIC | Age: 32
End: 2022-04-21
Payer: COMMERCIAL

## 2022-04-21 ENCOUNTER — ASOB RESULT (OUTPATIENT)
Age: 32
End: 2022-04-21

## 2022-04-21 VITALS
SYSTOLIC BLOOD PRESSURE: 116 MMHG | DIASTOLIC BLOOD PRESSURE: 69 MMHG | WEIGHT: 129 LBS | HEIGHT: 71 IN | BODY MASS INDEX: 18.06 KG/M2

## 2022-04-21 DIAGNOSIS — R10.2 OTHER SPECIFIED PREGNANCY RELATED CONDITIONS, UNSPECIFIED TRIMESTER: ICD-10-CM

## 2022-04-21 DIAGNOSIS — Z3A.16 16 WEEKS GESTATION OF PREGNANCY: ICD-10-CM

## 2022-04-21 DIAGNOSIS — O26.899 OTHER SPECIFIED PREGNANCY RELATED CONDITIONS, UNSPECIFIED TRIMESTER: ICD-10-CM

## 2022-04-21 PROCEDURE — 76815 OB US LIMITED FETUS(S): CPT

## 2022-04-21 PROCEDURE — 0502F SUBSEQUENT PRENATAL CARE: CPT

## 2022-04-22 LAB — BACTERIA UR CULT: NORMAL

## 2022-04-25 LAB
1ST TRIMESTER DATA: NORMAL
2ND TRIMESTER DATA: NORMAL
AFP PNL SERPL: NORMAL
AFP SERPL-ACNC: NORMAL
AFP SERPL-ACNC: NORMAL
B-HCG FREE SERPL-MCNC: NORMAL
CLINICAL BIOCHEMIST REVIEW: NORMAL
FREE BETA HCG 1ST TRIMESTER: NORMAL
INHIBIN A SERPL-MCNC: NORMAL
NOTES NTD: NORMAL
NT: NORMAL
PAPP-A SERPL-ACNC: NORMAL
U ESTRIOL SERPL-SCNC: NORMAL

## 2022-05-16 ENCOUNTER — ASOB RESULT (OUTPATIENT)
Age: 32
End: 2022-05-16

## 2022-05-16 ENCOUNTER — APPOINTMENT (OUTPATIENT)
Dept: OBGYN | Facility: CLINIC | Age: 32
End: 2022-05-16
Payer: COMMERCIAL

## 2022-05-16 PROCEDURE — 0502F SUBSEQUENT PRENATAL CARE: CPT

## 2022-05-16 PROCEDURE — 76805 OB US >/= 14 WKS SNGL FETUS: CPT

## 2022-06-20 ENCOUNTER — APPOINTMENT (OUTPATIENT)
Dept: OBGYN | Facility: CLINIC | Age: 32
End: 2022-06-20
Payer: COMMERCIAL

## 2022-06-20 ENCOUNTER — ASOB RESULT (OUTPATIENT)
Age: 32
End: 2022-06-20

## 2022-06-20 VITALS
SYSTOLIC BLOOD PRESSURE: 121 MMHG | DIASTOLIC BLOOD PRESSURE: 70 MMHG | HEIGHT: 71 IN | BODY MASS INDEX: 19.88 KG/M2 | WEIGHT: 142 LBS

## 2022-06-20 DIAGNOSIS — Z3A.24 24 WEEKS GESTATION OF PREGNANCY: ICD-10-CM

## 2022-06-20 PROCEDURE — 0502F SUBSEQUENT PRENATAL CARE: CPT

## 2022-06-20 PROCEDURE — 76816 OB US FOLLOW-UP PER FETUS: CPT

## 2022-07-19 ENCOUNTER — APPOINTMENT (OUTPATIENT)
Dept: OBGYN | Facility: CLINIC | Age: 32
End: 2022-07-19

## 2022-07-19 ENCOUNTER — ASOB RESULT (OUTPATIENT)
Age: 32
End: 2022-07-19

## 2022-07-19 VITALS
HEIGHT: 71 IN | WEIGHT: 144 LBS | DIASTOLIC BLOOD PRESSURE: 72 MMHG | SYSTOLIC BLOOD PRESSURE: 108 MMHG | BODY MASS INDEX: 20.16 KG/M2

## 2022-07-19 DIAGNOSIS — I86.3 VULVAL VARICES: ICD-10-CM

## 2022-07-19 DIAGNOSIS — Z23 ENCOUNTER FOR IMMUNIZATION: ICD-10-CM

## 2022-07-19 DIAGNOSIS — Z3A.28 28 WEEKS GESTATION OF PREGNANCY: ICD-10-CM

## 2022-07-19 LAB
BASOPHILS # BLD AUTO: 0.04 K/UL
BASOPHILS NFR BLD AUTO: 0.4 %
BILIRUB UR QL STRIP: NORMAL
CLARITY UR: CLEAR
COLLECTION METHOD: NORMAL
EOSINOPHIL # BLD AUTO: 0.09 K/UL
EOSINOPHIL NFR BLD AUTO: 0.9 %
GLUCOSE 1H P 50 G GLC PO SERPL-MCNC: 125 MG/DL
GLUCOSE UR-MCNC: NORMAL
HCG UR QL: 0.2 EU/DL
HCT VFR BLD CALC: 35.5 %
HGB BLD-MCNC: 11.2 G/DL
HGB UR QL STRIP.AUTO: NORMAL
IMM GRANULOCYTES NFR BLD AUTO: 0.7 %
KETONES UR-MCNC: NORMAL
LEUKOCYTE ESTERASE UR QL STRIP: NORMAL
LYMPHOCYTES # BLD AUTO: 1.77 K/UL
LYMPHOCYTES NFR BLD AUTO: 18 %
MAN DIFF?: NORMAL
MCHC RBC-ENTMCNC: 29.3 PG
MCHC RBC-ENTMCNC: 31.5 GM/DL
MCV RBC AUTO: 92.9 FL
MONOCYTES # BLD AUTO: 0.43 K/UL
MONOCYTES NFR BLD AUTO: 4.4 %
NEUTROPHILS # BLD AUTO: 7.42 K/UL
NEUTROPHILS NFR BLD AUTO: 75.6 %
NITRITE UR QL STRIP: NORMAL
PH UR STRIP: 7.5
PLATELET # BLD AUTO: 355 K/UL
PROT UR STRIP-MCNC: NORMAL
RBC # BLD: 3.82 M/UL
RBC # FLD: 12 %
SP GR UR STRIP: 1.02
WBC # FLD AUTO: 9.82 K/UL

## 2022-07-19 PROCEDURE — 0502F SUBSEQUENT PRENATAL CARE: CPT

## 2022-07-19 PROCEDURE — 76816 OB US FOLLOW-UP PER FETUS: CPT

## 2022-07-20 LAB
BLD GP AB SCN SERPL QL: NORMAL
HIV1+2 AB SPEC QL IA.RAPID: NONREACTIVE

## 2022-08-15 ENCOUNTER — ASOB RESULT (OUTPATIENT)
Age: 32
End: 2022-08-15

## 2022-08-15 ENCOUNTER — APPOINTMENT (OUTPATIENT)
Dept: OBGYN | Facility: CLINIC | Age: 32
End: 2022-08-15

## 2022-08-15 VITALS
DIASTOLIC BLOOD PRESSURE: 70 MMHG | BODY MASS INDEX: 20.58 KG/M2 | HEIGHT: 71 IN | SYSTOLIC BLOOD PRESSURE: 114 MMHG | WEIGHT: 147 LBS

## 2022-08-15 DIAGNOSIS — Z3A.32 32 WEEKS GESTATION OF PREGNANCY: ICD-10-CM

## 2022-08-15 PROCEDURE — 0502F SUBSEQUENT PRENATAL CARE: CPT

## 2022-08-15 PROCEDURE — 76819 FETAL BIOPHYS PROFIL W/O NST: CPT | Mod: 59

## 2022-08-15 PROCEDURE — 76816 OB US FOLLOW-UP PER FETUS: CPT

## 2022-08-24 ENCOUNTER — NON-APPOINTMENT (OUTPATIENT)
Age: 32
End: 2022-08-24

## 2022-08-30 ENCOUNTER — APPOINTMENT (OUTPATIENT)
Dept: OBGYN | Facility: CLINIC | Age: 32
End: 2022-08-30

## 2022-08-30 VITALS
SYSTOLIC BLOOD PRESSURE: 100 MMHG | HEIGHT: 71 IN | DIASTOLIC BLOOD PRESSURE: 60 MMHG | WEIGHT: 147 LBS | BODY MASS INDEX: 20.58 KG/M2

## 2022-08-30 DIAGNOSIS — Z34.91 ENCOUNTER FOR SUPERVISION OF NORMAL PREGNANCY, UNSPECIFIED, FIRST TRIMESTER: ICD-10-CM

## 2022-08-30 DIAGNOSIS — Z3A.34 34 WEEKS GESTATION OF PREGNANCY: ICD-10-CM

## 2022-08-30 PROCEDURE — 0502F SUBSEQUENT PRENATAL CARE: CPT

## 2022-09-12 ENCOUNTER — APPOINTMENT (OUTPATIENT)
Dept: OBGYN | Facility: CLINIC | Age: 32
End: 2022-09-12
Payer: COMMERCIAL

## 2022-09-12 ENCOUNTER — ASOB RESULT (OUTPATIENT)
Age: 32
End: 2022-09-12

## 2022-09-12 VITALS
WEIGHT: 151 LBS | BODY MASS INDEX: 21.14 KG/M2 | SYSTOLIC BLOOD PRESSURE: 110 MMHG | DIASTOLIC BLOOD PRESSURE: 71 MMHG | HEIGHT: 71 IN

## 2022-09-12 DIAGNOSIS — Z3A.36 36 WEEKS GESTATION OF PREGNANCY: ICD-10-CM

## 2022-09-12 DIAGNOSIS — I86.3 VULVAL VARICES: ICD-10-CM

## 2022-09-12 PROCEDURE — 76815 OB US LIMITED FETUS(S): CPT

## 2022-09-12 PROCEDURE — 76819 FETAL BIOPHYS PROFIL W/O NST: CPT | Mod: 59

## 2022-09-12 PROCEDURE — 0502F SUBSEQUENT PRENATAL CARE: CPT

## 2022-09-12 PROCEDURE — 76816 OB US FOLLOW-UP PER FETUS: CPT

## 2022-09-13 ENCOUNTER — NON-APPOINTMENT (OUTPATIENT)
Age: 32
End: 2022-09-13

## 2022-09-13 ENCOUNTER — OUTPATIENT (OUTPATIENT)
Dept: OUTPATIENT SERVICES | Facility: HOSPITAL | Age: 32
LOS: 1 days | End: 2022-09-13
Payer: COMMERCIAL

## 2022-09-13 VITALS — OXYGEN SATURATION: 96 % | DIASTOLIC BLOOD PRESSURE: 57 MMHG | SYSTOLIC BLOOD PRESSURE: 95 MMHG | HEART RATE: 69 BPM

## 2022-09-13 VITALS — OXYGEN SATURATION: 100 %

## 2022-09-13 DIAGNOSIS — Z3A.00 WEEKS OF GESTATION OF PREGNANCY NOT SPECIFIED: ICD-10-CM

## 2022-09-13 DIAGNOSIS — O26.899 OTHER SPECIFIED PREGNANCY RELATED CONDITIONS, UNSPECIFIED TRIMESTER: ICD-10-CM

## 2022-09-13 LAB
APPEARANCE UR: CLEAR — SIGNIFICANT CHANGE UP
BILIRUB UR-MCNC: NEGATIVE — SIGNIFICANT CHANGE UP
COLOR SPEC: SIGNIFICANT CHANGE UP
DIFF PNL FLD: NEGATIVE — SIGNIFICANT CHANGE UP
GLUCOSE UR QL: NEGATIVE — SIGNIFICANT CHANGE UP
KETONES UR-MCNC: ABNORMAL
LEUKOCYTE ESTERASE UR-ACNC: NEGATIVE — SIGNIFICANT CHANGE UP
NITRITE UR-MCNC: NEGATIVE — SIGNIFICANT CHANGE UP
PH UR: 6 — SIGNIFICANT CHANGE UP (ref 5–8)
PROT UR-MCNC: NEGATIVE — SIGNIFICANT CHANGE UP
SP GR SPEC: 1.01 — LOW (ref 1.01–1.02)
UROBILINOGEN FLD QL: NEGATIVE — SIGNIFICANT CHANGE UP

## 2022-09-13 PROCEDURE — G0463: CPT

## 2022-09-13 PROCEDURE — 59025 FETAL NON-STRESS TEST: CPT

## 2022-09-13 PROCEDURE — 81003 URINALYSIS AUTO W/O SCOPE: CPT

## 2022-09-13 RX ORDER — SODIUM CHLORIDE 9 MG/ML
1000 INJECTION, SOLUTION INTRAVENOUS
Refills: 0 | Status: DISCONTINUED | OUTPATIENT
Start: 2022-09-13 | End: 2022-09-13

## 2022-09-13 RX ADMIN — SODIUM CHLORIDE 125 MILLILITER(S): 9 INJECTION, SOLUTION INTRAVENOUS at 19:47

## 2022-09-13 NOTE — OB PROVIDER TRIAGE NOTE - NSOBPROVIDERNOTE_OBGYN_ALL_OB_FT
A&P:    at 36w6d presenting for r/o labor with frequent ctx. Ingrid every 4-6mins.    Plan:  - Repeat VE in 1 hour (730p)  - IV hydration  - toco/EFM      Shruthi Garcia, PGY1  d/w Dr. Love A&P:    at 36w6d presenting for r/o labor with frequent ctx. Ingrid every 4-6mins.    Plan:  - Repeat VE in 1 hour (730p)  - IV hydration  - toco/EFM      Shruthi Garcia, PGY1  d/w Dr. Love    OB PA Progress Note    Pt seen and evaluated at bedside. Still reports contractions now 5/10 in intensity, spacing out after IVF hydration. Denies LOF, VB. +FM.    Exam  VSS  SVE /-3, unchanged after 3 VEs over the course of 4.5hours  EFM 120bpm, moderate variability, +accels, -decels  [place] irregular    A/P:  - no change in VE after 4 hours of monitoring   - EFM reactive  - Return precautions given  - Next office visit next Thursday as scheduled   - Discussed with Dr. Ivan Dominguez PAJimmyC

## 2022-09-13 NOTE — OB RN TRIAGE NOTE - NS_TRIAGENAMEOFATTENDINGNOTIFIED_OBGYN_ALL_OB_FT
Dermal Autograft Text: The defect edges were debeveled with a #15 scalpel blade.  Given the location of the defect, shape of the defect and the proximity to free margins a dermal autograft was deemed most appropriate.  Using a sterile surgical marker, the primary defect shape was transferred to the donor site. The area thus outlined was incised deep to adipose tissue with a #15 scalpel blade.  The harvested graft was then trimmed of adipose and epidermal tissue until only dermis was left.  The skin graft was then placed in the primary defect and oriented appropriately. Ivan RUIZ

## 2022-09-13 NOTE — OB PROVIDER TRIAGE NOTE - HISTORY OF PRESENT ILLNESS
R1 H&P    Pt is a 31y/o  at 36w6d evaluated for possible labor. +ctx, +FM, -VB, -LOF  Pt presented to triage reporting contractions every 5 mins. She reports that they started around 2:30pm and have gotten closer together. They are 4/10 pain. She reports that her OB told her to come in when her contractions get closer than 7 minutes to be evaluated, but when she was examined in the office yesterday she was closed.  GBS unknown (swabbed yesterday)  EFW [id number]    OBHx:   -   6#15  -  2020 8#3  - SAB   GynHx: denies h/o abnormal paps, STI's, fibroids, cysts  PMHx: denies  PSHx: rhinoplasty   Med: PNV  All: sulf[place]  SH: denies alcohol, tobacco, or drug use  Psych: denies h/o anxiety or depression

## 2022-09-14 LAB — B-HEM STREP SPEC QL CULT: NORMAL

## 2022-09-22 ENCOUNTER — APPOINTMENT (OUTPATIENT)
Dept: OBGYN | Facility: CLINIC | Age: 32
End: 2022-09-22

## 2022-09-22 ENCOUNTER — ASOB RESULT (OUTPATIENT)
Age: 32
End: 2022-09-22

## 2022-09-22 VITALS
DIASTOLIC BLOOD PRESSURE: 82 MMHG | HEIGHT: 71 IN | WEIGHT: 149 LBS | BODY MASS INDEX: 20.86 KG/M2 | SYSTOLIC BLOOD PRESSURE: 128 MMHG

## 2022-09-22 PROCEDURE — 0502F SUBSEQUENT PRENATAL CARE: CPT

## 2022-09-22 PROCEDURE — 76819 FETAL BIOPHYS PROFIL W/O NST: CPT

## 2022-09-22 PROCEDURE — 82270 OCCULT BLOOD FECES: CPT

## 2022-09-23 ENCOUNTER — APPOINTMENT (OUTPATIENT)
Dept: OBGYN | Facility: CLINIC | Age: 32
End: 2022-09-23

## 2022-09-23 VITALS — DIASTOLIC BLOOD PRESSURE: 69 MMHG | WEIGHT: 149 LBS | BODY MASS INDEX: 20.78 KG/M2 | SYSTOLIC BLOOD PRESSURE: 107 MMHG

## 2022-09-23 DIAGNOSIS — O47.00 FALSE LABOR BEFORE 37 COMPLETED WEEKS OF GESTATION, UNSPECIFIED TRIMESTER: ICD-10-CM

## 2022-09-23 PROCEDURE — 0502F SUBSEQUENT PRENATAL CARE: CPT

## 2022-09-26 ENCOUNTER — OUTPATIENT (OUTPATIENT)
Dept: OUTPATIENT SERVICES | Facility: HOSPITAL | Age: 32
LOS: 1 days | End: 2022-09-26
Payer: COMMERCIAL

## 2022-09-26 DIAGNOSIS — Z11.52 ENCOUNTER FOR SCREENING FOR COVID-19: ICD-10-CM

## 2022-09-26 LAB — SARS-COV-2 RNA SPEC QL NAA+PROBE: SIGNIFICANT CHANGE UP

## 2022-09-26 PROCEDURE — C9803: CPT

## 2022-09-26 PROCEDURE — U0003: CPT

## 2022-09-26 PROCEDURE — U0005: CPT

## 2022-09-27 ENCOUNTER — APPOINTMENT (OUTPATIENT)
Dept: OBGYN | Facility: CLINIC | Age: 32
End: 2022-09-27

## 2022-09-27 ENCOUNTER — ASOB RESULT (OUTPATIENT)
Age: 32
End: 2022-09-27

## 2022-09-27 VITALS — WEIGHT: 159 LBS | BODY MASS INDEX: 22.18 KG/M2

## 2022-09-27 DIAGNOSIS — Z3A.38 38 WEEKS GESTATION OF PREGNANCY: ICD-10-CM

## 2022-09-27 PROCEDURE — 76816 OB US FOLLOW-UP PER FETUS: CPT

## 2022-09-27 PROCEDURE — 76819 FETAL BIOPHYS PROFIL W/O NST: CPT | Mod: 59

## 2022-09-27 PROCEDURE — 0502F SUBSEQUENT PRENATAL CARE: CPT

## 2022-09-28 ENCOUNTER — APPOINTMENT (OUTPATIENT)
Dept: OBGYN | Facility: CLINIC | Age: 32
End: 2022-09-28

## 2022-09-28 ENCOUNTER — INPATIENT (INPATIENT)
Facility: HOSPITAL | Age: 32
LOS: 1 days | Discharge: HOME | End: 2022-09-30
Attending: OBSTETRICS & GYNECOLOGY | Admitting: OBSTETRICS & GYNECOLOGY
Payer: COMMERCIAL

## 2022-09-28 VITALS — TEMPERATURE: 98 F

## 2022-09-28 DIAGNOSIS — Z33.1 PREGNANT STATE, INCIDENTAL: ICD-10-CM

## 2022-09-28 LAB
BASOPHILS # BLD AUTO: 0.04 K/UL — SIGNIFICANT CHANGE UP (ref 0–0.2)
BASOPHILS NFR BLD AUTO: 0.6 % — SIGNIFICANT CHANGE UP (ref 0–2)
BLD GP AB SCN SERPL QL: NEGATIVE — SIGNIFICANT CHANGE UP
COVID-19 SPIKE DOMAIN AB INTERP: POSITIVE
COVID-19 SPIKE DOMAIN ANTIBODY RESULT: >250 U/ML — HIGH
EOSINOPHIL # BLD AUTO: 0.08 K/UL — SIGNIFICANT CHANGE UP (ref 0–0.5)
EOSINOPHIL NFR BLD AUTO: 1.1 % — SIGNIFICANT CHANGE UP (ref 0–6)
HCT VFR BLD CALC: 30.2 % — LOW (ref 34.5–45)
HGB BLD-MCNC: 9.3 G/DL — LOW (ref 11.5–15.5)
IMM GRANULOCYTES NFR BLD AUTO: 0.6 % — SIGNIFICANT CHANGE UP (ref 0–0.9)
LYMPHOCYTES # BLD AUTO: 2.41 K/UL — SIGNIFICANT CHANGE UP (ref 1–3.3)
LYMPHOCYTES # BLD AUTO: 33.5 % — SIGNIFICANT CHANGE UP (ref 13–44)
MCHC RBC-ENTMCNC: 26.1 PG — LOW (ref 27–34)
MCHC RBC-ENTMCNC: 30.8 GM/DL — LOW (ref 32–36)
MCV RBC AUTO: 84.6 FL — SIGNIFICANT CHANGE UP (ref 80–100)
MONOCYTES # BLD AUTO: 0.42 K/UL — SIGNIFICANT CHANGE UP (ref 0–0.9)
MONOCYTES NFR BLD AUTO: 5.8 % — SIGNIFICANT CHANGE UP (ref 2–14)
NEUTROPHILS # BLD AUTO: 4.2 K/UL — SIGNIFICANT CHANGE UP (ref 1.8–7.4)
NEUTROPHILS NFR BLD AUTO: 58.4 % — SIGNIFICANT CHANGE UP (ref 43–77)
NRBC # BLD: 0 /100 WBCS — SIGNIFICANT CHANGE UP (ref 0–0)
PLATELET # BLD AUTO: 345 K/UL — SIGNIFICANT CHANGE UP (ref 150–400)
RBC # BLD: 3.57 M/UL — LOW (ref 3.8–5.2)
RBC # FLD: 13.2 % — SIGNIFICANT CHANGE UP (ref 10.3–14.5)
RH IG SCN BLD-IMP: POSITIVE — SIGNIFICANT CHANGE UP
SARS-COV-2 IGG+IGM SERPL QL IA: >250 U/ML — HIGH
SARS-COV-2 IGG+IGM SERPL QL IA: POSITIVE
T PALLIDUM AB TITR SER: NEGATIVE — SIGNIFICANT CHANGE UP
WBC # BLD: 7.19 K/UL — SIGNIFICANT CHANGE UP (ref 3.8–10.5)
WBC # FLD AUTO: 7.19 K/UL — SIGNIFICANT CHANGE UP (ref 3.8–10.5)

## 2022-09-28 PROCEDURE — 59400 OBSTETRICAL CARE: CPT

## 2022-09-28 RX ORDER — LANOLIN
1 OINTMENT (GRAM) TOPICAL EVERY 6 HOURS
Refills: 0 | Status: DISCONTINUED | OUTPATIENT
Start: 2022-09-28 | End: 2022-09-30

## 2022-09-28 RX ORDER — INFLUENZA VIRUS VACCINE 15; 15; 15; 15 UG/.5ML; UG/.5ML; UG/.5ML; UG/.5ML
0.5 SUSPENSION INTRAMUSCULAR ONCE
Refills: 0 | Status: DISCONTINUED | OUTPATIENT
Start: 2022-09-28 | End: 2022-09-30

## 2022-09-28 RX ORDER — DIBUCAINE 1 %
1 OINTMENT (GRAM) RECTAL EVERY 6 HOURS
Refills: 0 | Status: DISCONTINUED | OUTPATIENT
Start: 2022-09-28 | End: 2022-09-30

## 2022-09-28 RX ORDER — CITRIC ACID/SODIUM CITRATE 300-500 MG
15 SOLUTION, ORAL ORAL EVERY 6 HOURS
Refills: 0 | Status: DISCONTINUED | OUTPATIENT
Start: 2022-09-28 | End: 2022-09-28

## 2022-09-28 RX ORDER — OXYCODONE HYDROCHLORIDE 5 MG/1
5 TABLET ORAL
Refills: 0 | Status: DISCONTINUED | OUTPATIENT
Start: 2022-09-28 | End: 2022-09-30

## 2022-09-28 RX ORDER — OXYTOCIN 10 UNIT/ML
VIAL (ML) INJECTION
Qty: 30 | Refills: 0 | Status: DISCONTINUED | OUTPATIENT
Start: 2022-09-28 | End: 2022-09-28

## 2022-09-28 RX ORDER — OXYTOCIN 10 UNIT/ML
333.33 VIAL (ML) INJECTION
Qty: 20 | Refills: 0 | Status: DISCONTINUED | OUTPATIENT
Start: 2022-09-28 | End: 2022-09-30

## 2022-09-28 RX ORDER — IBUPROFEN 200 MG
600 TABLET ORAL EVERY 6 HOURS
Refills: 0 | Status: DISCONTINUED | OUTPATIENT
Start: 2022-09-28 | End: 2022-09-30

## 2022-09-28 RX ORDER — AER TRAVELER 0.5 G/1
1 SOLUTION RECTAL; TOPICAL EVERY 4 HOURS
Refills: 0 | Status: DISCONTINUED | OUTPATIENT
Start: 2022-09-28 | End: 2022-09-30

## 2022-09-28 RX ORDER — KETOROLAC TROMETHAMINE 30 MG/ML
30 SYRINGE (ML) INJECTION ONCE
Refills: 0 | Status: DISCONTINUED | OUTPATIENT
Start: 2022-09-28 | End: 2022-09-28

## 2022-09-28 RX ORDER — SODIUM CHLORIDE 9 MG/ML
1000 INJECTION, SOLUTION INTRAVENOUS
Refills: 0 | Status: DISCONTINUED | OUTPATIENT
Start: 2022-09-28 | End: 2022-09-28

## 2022-09-28 RX ORDER — OXYCODONE HYDROCHLORIDE 5 MG/1
5 TABLET ORAL ONCE
Refills: 0 | Status: DISCONTINUED | OUTPATIENT
Start: 2022-09-28 | End: 2022-09-30

## 2022-09-28 RX ORDER — DIPHENHYDRAMINE HCL 50 MG
25 CAPSULE ORAL EVERY 6 HOURS
Refills: 0 | Status: DISCONTINUED | OUTPATIENT
Start: 2022-09-28 | End: 2022-09-30

## 2022-09-28 RX ORDER — SODIUM CHLORIDE 9 MG/ML
3 INJECTION INTRAMUSCULAR; INTRAVENOUS; SUBCUTANEOUS EVERY 8 HOURS
Refills: 0 | Status: DISCONTINUED | OUTPATIENT
Start: 2022-09-28 | End: 2022-09-30

## 2022-09-28 RX ORDER — CHLORHEXIDINE GLUCONATE 213 G/1000ML
1 SOLUTION TOPICAL ONCE
Refills: 0 | Status: DISCONTINUED | OUTPATIENT
Start: 2022-09-28 | End: 2022-09-28

## 2022-09-28 RX ORDER — OXYTOCIN 10 UNIT/ML
333.33 VIAL (ML) INJECTION
Qty: 20 | Refills: 0 | Status: DISCONTINUED | OUTPATIENT
Start: 2022-09-28 | End: 2022-09-28

## 2022-09-28 RX ORDER — SIMETHICONE 80 MG/1
80 TABLET, CHEWABLE ORAL EVERY 4 HOURS
Refills: 0 | Status: DISCONTINUED | OUTPATIENT
Start: 2022-09-28 | End: 2022-09-30

## 2022-09-28 RX ORDER — IBUPROFEN 200 MG
600 TABLET ORAL EVERY 6 HOURS
Refills: 0 | Status: COMPLETED | OUTPATIENT
Start: 2022-09-28 | End: 2023-08-27

## 2022-09-28 RX ORDER — ACETAMINOPHEN 500 MG
975 TABLET ORAL
Refills: 0 | Status: DISCONTINUED | OUTPATIENT
Start: 2022-09-28 | End: 2022-09-30

## 2022-09-28 RX ORDER — TETANUS TOXOID, REDUCED DIPHTHERIA TOXOID AND ACELLULAR PERTUSSIS VACCINE, ADSORBED 5; 2.5; 8; 8; 2.5 [IU]/.5ML; [IU]/.5ML; UG/.5ML; UG/.5ML; UG/.5ML
0.5 SUSPENSION INTRAMUSCULAR ONCE
Refills: 0 | Status: DISCONTINUED | OUTPATIENT
Start: 2022-09-28 | End: 2022-09-30

## 2022-09-28 RX ORDER — PRAMOXINE HYDROCHLORIDE 150 MG/15G
1 AEROSOL, FOAM RECTAL EVERY 4 HOURS
Refills: 0 | Status: DISCONTINUED | OUTPATIENT
Start: 2022-09-28 | End: 2022-09-30

## 2022-09-28 RX ORDER — MAGNESIUM HYDROXIDE 400 MG/1
30 TABLET, CHEWABLE ORAL
Refills: 0 | Status: DISCONTINUED | OUTPATIENT
Start: 2022-09-28 | End: 2022-09-30

## 2022-09-28 RX ORDER — BENZOCAINE 10 %
1 GEL (GRAM) MUCOUS MEMBRANE EVERY 6 HOURS
Refills: 0 | Status: DISCONTINUED | OUTPATIENT
Start: 2022-09-28 | End: 2022-09-30

## 2022-09-28 RX ORDER — HYDROCORTISONE 1 %
1 OINTMENT (GRAM) TOPICAL EVERY 6 HOURS
Refills: 0 | Status: DISCONTINUED | OUTPATIENT
Start: 2022-09-28 | End: 2022-09-30

## 2022-09-28 RX ADMIN — SODIUM CHLORIDE 3 MILLILITER(S): 9 INJECTION INTRAMUSCULAR; INTRAVENOUS; SUBCUTANEOUS at 23:43

## 2022-09-28 RX ADMIN — Medication 975 MILLIGRAM(S): at 23:24

## 2022-09-28 RX ADMIN — Medication 30 MILLIGRAM(S): at 19:50

## 2022-09-28 NOTE — OB RN DELIVERY SUMMARY - NSSELHIDDEN_OBGYN_ALL_OB_FT
[NS_DeliveryAttending1_OBGYN_ALL_OB_FT:[id number]=] [NS_DeliveryAttending1_OBGYN_ALL_OB_FT:[id number]=],[NS_DeliveryRN_OBGYN_ALL_OB_FT:[id number]==]

## 2022-09-28 NOTE — OB PROVIDER H&P - HISTORY OF PRESENT ILLNESS
OB PA Admission Note    31 y/o  @39.0wks (AMALIA 10/5) admitted for scheduled eIOL. Denies contractions, LOF, VB. +FM. GBS negative. EFW 3300g.       OBHx:   -   6#15  -   8#3  - SAB   GynHx: denies h/o abnormal paps, STI's, fibroids, cysts  PMHx: denies  PSHx: rhinoplasty   Med: PNV  All: sulfa, ceclor  SH: denies alcohol, tobacco, or drug use  Psych: denies h/o anxiety or depression

## 2022-09-28 NOTE — OB RN DELIVERY SUMMARY - NS_SEPSISRSKCALC_OBGYN_ALL_OB_FT
EOS calculated successfully. EOS Risk Factor: 0.5/1000 live births ([facility] national incidence); GA=39w;Temp=98.78; ROM=5.2; GBS='Negative'; Antibiotics='No antibiotics or any antibiotics < 2 hrs prior to birth'

## 2022-09-28 NOTE — OB PROVIDER LABOR PROGRESS NOTE - NS_SUBJECTIVE/OBJECTIVE_OBGYN_ALL_OB_FT
Pt seen chart rev. As per above note. Agree with management and plan.  Rev pts hx.
pt requesting epidural  T(C): 36.8 (09-28-22 @ 08:54), Max: 36.8 (09-28-22 @ 08:54)  HR: 73 (09-28-22 @ 09:10) (64 - 88)  BP: 114/65 (09-28-22 @ 08:54) (110/59 - 114/65)  RR: 20 (09-28-22 @ 05:40) (20 - 20)  SpO2: 99% (09-28-22 @ 09:10) (97% - 100%)
In to check on patient.  Resting comfortably

## 2022-09-28 NOTE — OB PROVIDER LABOR PROGRESS NOTE - ASSESSMENT
cw Pitocin  called for epidural
Plan   cont pit after AROM clear fluid  management to delivery
Pt admitted for inductioin  recieved one dose of cytrotec  will convert to pitocin at 9am  epidural PRJASMINE Sanchez MD

## 2022-09-28 NOTE — OB RN PATIENT PROFILE - FALL HARM RISK - UNIVERSAL INTERVENTIONS
Bed in lowest position, wheels locked, appropriate side rails in place/Call bell, personal items and telephone in reach/Instruct patient to call for assistance before getting out of bed or chair/Non-slip footwear when patient is out of bed/[place] to call system/Physically safe environment - no spills, clutter or unnecessary equipment/Purposeful Proactive Rounding/Room/bathroom lighting operational, light cord in reach

## 2022-09-28 NOTE — OB RN PATIENT PROFILE - LIVING CHILDREN, OB PROFILE
I will START or STAY ON the medications listed below when I get home from the hospital:    lisinopril 10 mg oral tablet  -- 1 tab(s) by mouth once a day  -- Indication: For HTN    prochlorperazine 10 mg oral tablet  -- 1 tab(s) by mouth 4 times a day, As Needed for nausea  -- Indication: For nausea    simvastatin 10 mg oral tablet  -- 1 tab(s) by mouth once a day (at bedtime)  -- Indication: For Hypercholesterolemia    amLODIPine 10 mg oral tablet  -- 1 tab(s) by mouth once a day  -- Indication: For HTN    senna oral tablet  -- 1 tab(s) by mouth 2 times a day, As Needed  -- Indication: For constipation    docusate sodium 100 mg oral capsule  -- 1 cap(s) by mouth 2 times a day, As Needed for constipation  -- Indication: For constipation    sucralfate 1 g oral tablet  -- 1 tab(s) by mouth once a day (in the evening)  -- Indication: For gastric cancer    Protonix 40 mg oral delayed release tablet  -- 1 cap(s) by mouth 2 times a day  -- Indication: For gastric cancer    Calcium 600+D oral tablet  -- 1 tab(s) by mouth 2 times a day  -- Indication: For History of total left knee replacement    Vol-Care Rx oral tablet  -- 1 tab(s) by mouth once a day  -- Indication: For History of total left knee replacement 2

## 2022-09-28 NOTE — OB RN PATIENT PROFILE - NS_FINALEDD_OBGYN_ALL_OB_DT
Medicaid requirements are trial and failure of:   ( New insurance )    Vyvanse for 60 consecutive days with 1 dose adjustment  AND  Methylphenidate or Dexmethylphenidate for 60 consecutive days with 1 dose adjustment.    Until patient has met these requirements, no prior auth can be obtained.        Please advise.     05-Oct-2022

## 2022-09-28 NOTE — OB PROVIDER H&P - ASSESSMENT
A/P: 31 y/o G 4 P 2012 @39.0 wks (AMALIA 10/5) admitted for scheduled eIOL.  - Admit to L&D  - Routine labs, IVF, NPO  - EFM: Cat I, continuous monitoring  - GBS: neg  - IOL with Buccal cytotec  - Anesthesia consult  - Discussed with Dr. Mick Dominguez, PAJimmyC

## 2022-09-28 NOTE — OB PROVIDER H&P - NSHPPHYSICALEXAM_GEN_ALL_CORE
Vital Signs Last 24 Hrs  T(C): --  T(F): --  HR: 88 (28 Sep 2022 05:14) (75 - 88)  BP: 110/59 (28 Sep 2022 04:48) (110/59 - 110/59)  BP(mean): --  RR: --  SpO2: 99% (28 Sep 2022 05:14) (99% - 100%)    Gen: NAD  CV: NRRR  Lungs: CTA  Abd: soft, gravid, non-tender  SVE: 2/70/-3  EFM: 130bpm, moderate variability, +accels, -decels  [place]: irregular  BSUS: vtx

## 2022-09-28 NOTE — OB PROVIDER DELIVERY SUMMARY - NSPROVIDERDELIVERYNOTE_OBGYN_ALL_OB_FT
live female apgar 9/9 over MLE.   Three vessel cord   placenta del spont intact  cervix and vagina without laceration  episiotomy repaired in std fashion    pt and baby mai procedure well

## 2022-09-29 DIAGNOSIS — R20.0 ANESTHESIA OF SKIN: ICD-10-CM

## 2022-09-29 RX ADMIN — Medication 600 MILLIGRAM(S): at 15:58

## 2022-09-29 RX ADMIN — Medication 975 MILLIGRAM(S): at 00:24

## 2022-09-29 RX ADMIN — Medication 975 MILLIGRAM(S): at 06:32

## 2022-09-29 RX ADMIN — Medication 600 MILLIGRAM(S): at 08:57

## 2022-09-29 RX ADMIN — Medication 600 MILLIGRAM(S): at 20:49

## 2022-09-29 RX ADMIN — Medication 600 MILLIGRAM(S): at 03:56

## 2022-09-29 RX ADMIN — Medication 600 MILLIGRAM(S): at 02:56

## 2022-09-29 RX ADMIN — Medication 975 MILLIGRAM(S): at 23:36

## 2022-09-29 RX ADMIN — Medication 600 MILLIGRAM(S): at 09:27

## 2022-09-29 RX ADMIN — Medication 1 TABLET(S): at 12:16

## 2022-09-29 RX ADMIN — Medication 975 MILLIGRAM(S): at 05:32

## 2022-09-29 RX ADMIN — Medication 975 MILLIGRAM(S): at 18:54

## 2022-09-29 RX ADMIN — Medication 975 MILLIGRAM(S): at 18:24

## 2022-09-29 RX ADMIN — Medication 975 MILLIGRAM(S): at 12:16

## 2022-09-29 RX ADMIN — Medication 600 MILLIGRAM(S): at 21:19

## 2022-09-29 RX ADMIN — Medication 600 MILLIGRAM(S): at 15:28

## 2022-09-29 RX ADMIN — Medication 975 MILLIGRAM(S): at 12:46

## 2022-09-29 NOTE — PROGRESS NOTE ADULT - ATTENDING COMMENTS
I have seen and examined this patient.  I agree with the above assessment and plan.  Continue current care.    Demetrice Love MD

## 2022-09-30 ENCOUNTER — TRANSCRIPTION ENCOUNTER (OUTPATIENT)
Age: 32
End: 2022-09-30

## 2022-09-30 VITALS
RESPIRATION RATE: 17 BRPM | TEMPERATURE: 98 F | HEART RATE: 63 BPM | DIASTOLIC BLOOD PRESSURE: 64 MMHG | OXYGEN SATURATION: 96 % | SYSTOLIC BLOOD PRESSURE: 100 MMHG

## 2022-09-30 PROCEDURE — 86900 BLOOD TYPING SEROLOGIC ABO: CPT

## 2022-09-30 PROCEDURE — 36415 COLL VENOUS BLD VENIPUNCTURE: CPT

## 2022-09-30 PROCEDURE — 86780 TREPONEMA PALLIDUM: CPT

## 2022-09-30 PROCEDURE — 86769 SARS-COV-2 COVID-19 ANTIBODY: CPT

## 2022-09-30 PROCEDURE — 59025 FETAL NON-STRESS TEST: CPT

## 2022-09-30 PROCEDURE — 86850 RBC ANTIBODY SCREEN: CPT

## 2022-09-30 PROCEDURE — 85025 COMPLETE CBC W/AUTO DIFF WBC: CPT

## 2022-09-30 PROCEDURE — 86901 BLOOD TYPING SEROLOGIC RH(D): CPT

## 2022-09-30 PROCEDURE — 59050 FETAL MONITOR W/REPORT: CPT

## 2022-09-30 RX ORDER — ACETAMINOPHEN 500 MG
3 TABLET ORAL
Qty: 0 | Refills: 0 | DISCHARGE
Start: 2022-09-30

## 2022-09-30 RX ORDER — IBUPROFEN 200 MG
1 TABLET ORAL
Qty: 0 | Refills: 0 | DISCHARGE
Start: 2022-09-30

## 2022-09-30 RX ADMIN — Medication 975 MILLIGRAM(S): at 11:35

## 2022-09-30 RX ADMIN — Medication 975 MILLIGRAM(S): at 12:30

## 2022-09-30 RX ADMIN — Medication 600 MILLIGRAM(S): at 09:20

## 2022-09-30 RX ADMIN — Medication 975 MILLIGRAM(S): at 06:47

## 2022-09-30 RX ADMIN — Medication 975 MILLIGRAM(S): at 00:06

## 2022-09-30 RX ADMIN — Medication 600 MILLIGRAM(S): at 03:18

## 2022-09-30 RX ADMIN — Medication 600 MILLIGRAM(S): at 08:24

## 2022-09-30 RX ADMIN — Medication 600 MILLIGRAM(S): at 03:48

## 2022-09-30 RX ADMIN — Medication 975 MILLIGRAM(S): at 06:17

## 2022-09-30 NOTE — DISCHARGE NOTE OB - MEDICATION SUMMARY - MEDICATIONS TO STOP TAKING
I will STOP taking the medications listed below when I get home from the hospital:    Macrobid 100 mg oral capsule  -- 1 cap(s) by mouth 2 times a day MDD:2caps  -- Finish all this medication unless otherwise directed by prescriber.  May discolor urine or feces.  Take with food or milk.

## 2022-09-30 NOTE — DISCHARGE NOTE OB - CARE PROVIDER_API CALL
Leeroy Villanueva)  [place] [address]  Phone: [phone]  Fax: [phone]  Follow Up Time:

## 2022-09-30 NOTE — DISCHARGE NOTE OB - MATERIALS PROVIDED
Vaccinations/Breastfeeding Log/Breastfeeding Mother’s Support Group Information/Guide to Postpartum Care/[facility] Hearing Screen Program/Shaken Baby Prevention Handout/Breastfeeding Guide and Packet

## 2022-09-30 NOTE — PROGRESS NOTE ADULT - ASSESSMENT
32 y.o. G 4 P 3013      PPD # 1 S/P  in stable condition.  
 32 y.o. G 4 P 3013      PPD # 1 S/P  in stable condition.

## 2022-09-30 NOTE — DISCHARGE NOTE OB - PATIENT PORTAL LINK FT
You can access the FollowMyHealth Patient Portal offered by [facility] by registering at the following website: [email or web address]. By joining [facility]’s FollowMyHealth portal, you will also be able to view your health information using other applications (apps) compatible with our system.

## 2022-09-30 NOTE — PROGRESS NOTE ADULT - SUBJECTIVE AND OBJECTIVE BOX
pt post partum day # 1; pt. sitting comfortably w/ baby, c/o only of mild numbness over L anterior thigh, which she reports continues to resolve over past few hours.  Pt denies pain/parasthesias or any motor weakness.       Pt's symptoms likely related to lithotomy position w/ mild injury to lateral femoral cutaneous nerve of the L thigh.  Pt's symptoms to not interfere in any way with ambulation/ADL's. Continue to monitor for resolution, pt. advised that symptoms may resolve spontaneously over weeks/months in some cases.  
PA PPD#1  Note    Blood Type:   A+       Rubella:   Immune         RPR:  NR  Pain:  Controlled  Complaints:   Pt. states that epidural site is sore & that she has Left Ant. Thigh numbness since prior to delivery.  Pt. states that after epidural was completed that the LLE was affected more than the RLE.  Pt. denies weakness, inabilitiy to ambulate, or pain.  Pt. is OOB, voiding, passing flatus, & tolerating regular diet.  Lochia:  WNL    VS:  Vital Signs Last 24 Hrs  T(C): 36.7 (29 Sep 2022 05:04), Max: 37.1 (28 Sep 2022 15:49)  T(F): 98 (29 Sep 2022 05:04), Max: 98.78 (28 Sep 2022 15:49)  HR: 74 (29 Sep 2022 05:04) (58 - 117)  BP: 101/65 (29 Sep 2022 05:04) (88/53 - 121/71)  BP(mean): 84 (28 Sep 2022 21:45) (74 - 84)  RR: 17 (29 Sep 2022 05:04) (17 - 20)  SpO2: 100% (29 Sep 2022 05:04) (79% - 100%)    Parameters below as of 29 Sep 2022 05:04  Patient On (Oxygen Delivery Method): room air                          9.3    7.19  )-----------( 345      ( 28 Sep 2022 08:13 )             30.2       Abd:  Soft, non-distended, non-tender            Fundus-firm  MedianEpisiotomy: C/D/I  Back:  small amount edema @  epidural site (pt. had 2 attempts) without erythema/tenderness.  Extremities:  Edema - none                     Calf Tenderness - none                      Left ant. thigh numbness without erythema/tenderness.  FROM.  Skin intact.    Assessment:    32 y.o. G 4 P 3013      PPD # 1 S/P  in stable condition.  PMHx significant for:  D&C, Rhinoplasty  Current Issues:  Soreness at epidural site & LLE ant. thighness numbness  Plan:  Increase OOB             Cont. Pain Protocol             Pt. advised to use warm paks @ epidural site.  Pt. advised LLE ant. thighness numbness residual from epidural & will resolve.  Will inform Anesthesia.             Cont. Reg. Diet             Cont. PP Taylor.    ANETA Crisostomo
PA PPD#1  Note    Blood Type:   A+      Rubella:   Immune             RPR:  NR  Pain:  Controlled  Complaints:  None.  Pt. states that ant. thigh numbness is much improved & no difficulties ambulating/moving.  Pt. is OOB, voiding, passing flatus, & tolerating regular diet.  Lochia:  WNL    VS:  Vital Signs Last 24 Hrs  T(C): 36.9 (30 Sep 2022 05:06), Max: 36.9 (30 Sep 2022 05:06)  T(F): 98.5 (30 Sep 2022 05:06), Max: 98.5 (30 Sep 2022 05:06)  HR: 63 (30 Sep 2022 05:06) (61 - 63)  BP: 100/64 (30 Sep 2022 05:06) (100/64 - 107/69)  RR: 17 (30 Sep 2022 05:06) (17 - 17)  SpO2: 96% (30 Sep 2022 05:06) (96% - 99%)    Parameters below as of 30 Sep 2022 05:06  Patient On (Oxygen Delivery Method): room air    Abd:  Soft, non-distended, non-tender            Fundus-firm  Median Episiotomy: C/D/I  Extremities:  Edema - none                     Calf Tenderness - none    Assessment:    32 y.o. G 4 P 3013      PPD # 1 S/P  in stable condition.  PMHx significant for:  D&C, Rhinoplasty  Current Issues:  Resolving Left Ant. Thigh numbness.  Pt. was evaluated by Anesthesia yesterday.  Plan:  Increase OOB             Cont. Tylenol, Motrin, Oxycodone             Cont. Reg. Diet             Cont. PP Anam Crisostomo PA-C

## 2022-09-30 NOTE — DISCHARGE NOTE OB - MEDICATION SUMMARY - MEDICATIONS TO TAKE
I will START or STAY ON the medications listed below when I get home from the hospital:    acetaminophen 325 mg oral tablet  -- 3 tab(s) by mouth every 6 hours, As Needed  -- Indication: For mild pain    ibuprofen 600 mg oral tablet  -- 1 tab(s) by mouth every 6 hours  -- Indication: For moderate pain    Prenatal Multivitamins with Folic Acid 1 mg oral tablet  -- 1 tab(s) by mouth once a day  -- Indication: For Postpartum

## 2022-09-30 NOTE — DISCHARGE NOTE OB - CARE PLAN
1 Principal Discharge DX:	Vaginal delivery  Assessment and plan of treatment:	follow up in 6 weeks

## 2022-11-11 ENCOUNTER — NON-APPOINTMENT (OUTPATIENT)
Age: 32
End: 2022-11-11

## 2022-11-11 ENCOUNTER — APPOINTMENT (OUTPATIENT)
Dept: OBGYN | Facility: CLINIC | Age: 32
End: 2022-11-11

## 2022-11-11 PROCEDURE — 0503F POSTPARTUM CARE VISIT: CPT

## 2022-11-14 ENCOUNTER — APPOINTMENT (OUTPATIENT)
Dept: OBGYN | Facility: CLINIC | Age: 32
End: 2022-11-14

## 2022-11-14 LAB — HPV HIGH+LOW RISK DNA PNL CVX: NOT DETECTED

## 2022-11-17 LAB — CYTOLOGY CVX/VAG DOC THIN PREP: NORMAL

## 2022-11-18 ENCOUNTER — APPOINTMENT (OUTPATIENT)
Dept: OBGYN | Facility: CLINIC | Age: 32
End: 2022-11-18

## 2023-01-12 ENCOUNTER — NON-APPOINTMENT (OUTPATIENT)
Age: 33
End: 2023-01-12

## 2023-01-13 DIAGNOSIS — Z30.41 ENCOUNTER FOR SURVEILLANCE OF CONTRACEPTIVE PILLS: ICD-10-CM

## 2023-01-13 RX ORDER — NORETHINDRONE ACETATE AND ETHINYL ESTRADIOL TABLETS AND FERROUS FUMARATE TABLETS 1MG-20(24)
1-20 KIT ORAL DAILY
Qty: 3 | Refills: 2 | Status: ACTIVE | COMMUNITY
Start: 2023-01-13 | End: 1900-01-01

## 2023-01-24 ENCOUNTER — NON-APPOINTMENT (OUTPATIENT)
Age: 33
End: 2023-01-24

## 2023-02-15 ENCOUNTER — APPOINTMENT (OUTPATIENT)
Dept: OBGYN | Facility: CLINIC | Age: 33
End: 2023-02-15
Payer: COMMERCIAL

## 2023-02-15 ENCOUNTER — NON-APPOINTMENT (OUTPATIENT)
Age: 33
End: 2023-02-15

## 2023-02-15 VITALS — SYSTOLIC BLOOD PRESSURE: 132 MMHG | DIASTOLIC BLOOD PRESSURE: 87 MMHG

## 2023-02-15 PROCEDURE — 99213 OFFICE O/P EST LOW 20 MIN: CPT

## 2023-02-15 NOTE — HISTORY OF PRESENT ILLNESS
[FreeTextEntry1] : 02/15/2023. VANNA SEXTON 32 year old female presents for an acute visit, 4 month PP w/ possible PPD. \par \par Pt is very tearful. She reports of possible PPD for the past month, states that she has been feeling sad, insufficient and is irritable. She also reports of trouble sleeping and tiredness. Denies feelings of harming herself or the baby. Admits to having support from /family members.\par \par She discontinued breast feeding a month ago. She is currently on Minastrin Fe 1/20. She denies abdominal and pelvic pain, no vaginal discharge or vaginitis symptoms. She reports normal urination, no dysuria, no incontinence of urine. BM is normal per patient, no blood in stool or constipation/diarrhea.

## 2023-02-15 NOTE — PLAN
[FreeTextEntry1] : 32 year old female presents for an acute visit, 4 month PP w/ possible PPD:\par Rx for Zoloft 50 MG all r/b/a reviewed.\par Pt to see a psychiatrist at [facility] for further evaluation.\par Exercise daily recommended.\par Advised pt to d/c OCP Minastrin 24 Fe 1/20 MG, d/w pt safer sexual practices including regular condom use.\par \par RTO PRN.

## 2023-02-23 ENCOUNTER — NON-APPOINTMENT (OUTPATIENT)
Age: 33
End: 2023-02-23

## 2023-03-07 ENCOUNTER — NON-APPOINTMENT (OUTPATIENT)
Age: 33
End: 2023-03-07

## 2023-03-29 ENCOUNTER — NON-APPOINTMENT (OUTPATIENT)
Age: 33
End: 2023-03-29

## 2023-05-04 ENCOUNTER — RX RENEWAL (OUTPATIENT)
Age: 33
End: 2023-05-04

## 2023-05-04 RX ORDER — SERTRALINE HYDROCHLORIDE 50 MG/1
50 TABLET, FILM COATED ORAL
Qty: 30 | Refills: 0 | Status: ACTIVE | COMMUNITY
Start: 2023-02-15 | End: 1900-01-01

## 2023-06-07 ENCOUNTER — APPOINTMENT (OUTPATIENT)
Dept: OBGYN | Facility: CLINIC | Age: 33
End: 2023-06-07
Payer: COMMERCIAL

## 2023-06-07 VITALS — SYSTOLIC BLOOD PRESSURE: 113 MMHG | DIASTOLIC BLOOD PRESSURE: 73 MMHG

## 2023-06-07 DIAGNOSIS — Z91.89 OTHER SPECIFIED PERSONAL RISK FACTORS, NOT ELSEWHERE CLASSIFIED: ICD-10-CM

## 2023-06-07 DIAGNOSIS — N94.0 MITTELSCHMERZ: ICD-10-CM

## 2023-06-07 DIAGNOSIS — N81.89 OTHER FEMALE GENITAL PROLAPSE: ICD-10-CM

## 2023-06-07 PROCEDURE — 99214 OFFICE O/P EST MOD 30 MIN: CPT

## 2023-06-07 NOTE — PHYSICAL EXAM
[Chaperone Present] : A chaperone was present in the examining room during all aspects of the physical examination [Appropriately responsive] : appropriately responsive [Alert] : alert [No Acute Distress] : no acute distress [Soft] : soft [Non-tender] : non-tender [Non-distended] : non-distended [No HSM] : No HSM [No Lesions] : no lesions [No Mass] : no mass [Oriented x3] : oriented x3 [Labia Majora] : normal [Labia Minora] : normal [Normal] : normal [Anteversion] : anteverted [Uterine Adnexae] : normal [No Lymphadenopathy] : no lymphadenopathy [Regular Rate Rhythm] : regular rate rhythm [No Murmurs] : no murmurs [Clear to Auscultation B/L] : clear to auscultation bilaterally [FreeTextEntry6] : Uterus facing anterior [FreeTextEntry9] : Rectal tone  wnl

## 2023-06-07 NOTE — PLAN
[FreeTextEntry1] : 6 month postpartum: \par Pt is doing well, mood has improved\par \par Inability to control flatulence: \par Likely due to weakened pelvic floor muscles. \par Instructions and pamphlet given for [facility] \par Referral given for Pelvic floor PT\par Possible visit to colorectal if no improvement. \par \par Ovulatory pain: \par Pt to scheduled a pelvic sono\par Motrin during ovulation\par \par Contraception: \par SHe will continue with condoms for now\par She will take time to consider COCP\par \par Skin moles: \par Referral given for dermatologist\par \par Fluid release from vagina after submerging in water\par Can try using tampon to wick up the fluid \par Likely due to long vaginal canal and water getting trapped. \par \par RTO for annual in 11/23\par

## 2023-06-07 NOTE — REVIEW OF SYSTEMS
[Incontinence] : incontinence [Negative] : Psychiatric [Urgency] : no urgency [Frequency] : no frequency [Dysuria] : no dysuria [Urethral Discharge] : no urethral discharge [Abn Vaginal bleeding] : no abnormal vaginal bleeding [Pelvic pain] : no pelvic pain [CVA Pain] : no CVA pain [Genital Rash/Irritation] : no genital rash/irritation

## 2023-06-07 NOTE — HISTORY OF PRESENT ILLNESS
[No] : Patient does not have concerns regarding sex [Currently Active] : currently active [Condoms] : Condoms [FreeTextEntry1] : 2023. VANNA SEXTON 33 year old female presents for 6 month postpartum visit. \par \par Reports monthly menses, not too heavy, not too painful. Denies  intermenstrual bleeding. She does report of midcycle ovulatory pain, on and off for 3 days. She has minor relief of pain w/ use of Tylenol or Motrin. Denies  abdominal and pelvic pain today or outside of menses. She denies  vaginal irritation, discharge, burning, and itching. Denies of fevers and chills. \par \par Pt came back in February for postpartum depressive sxs. She was put on a low dose Zoloft, which improved her sxs. She is no longer experiencing depressive episodes now and is feeling a lot better. She has weaned off of Zoloft. \par \par She is currently sexually active w/ . She is using condoms for now. She does not want contraception at this time due to chance of possible mood changes.  Declines IUD insertion at this time. She wants to take time before re starting COCP. \par \par She reports of vaginal drainage after submerging in water since her last  delivery. She denies urination during those times. She does report of rare  stress urinary incontinence when she coughs, sneezes or laughs. SHe notes inability to control flatulence st times since her last delivery. . She denies abnormal BM's, on incontinence of stool. and she notes normal urination. \par \par PMHx: Ocular migraines\par SHx: Rhinoplasty\par FMHx: PGF prostate ca age 80s, MGGM breast ca age 60s Denies Fhx of ovarian, uterinse, pancreatic, and colon cancer\par OBHx: \par 2019 FT  Grace\par  FT  Peter\par  FT  Roxana\par complete ab x 1 \par GynHx: Umbilical hernia, ovulatory pain  Denies hx of ovarian cyst, fibroids, ABnl pap, breast bx. \par All: Ceclor Caps\par Meds: Denies\par \par S/p COVID vaccines x 2 w/ boosterx1 [PapSmeardate] : 11/22 [TextBox_31] : nilm, HPV not detected.

## 2023-06-23 ENCOUNTER — APPOINTMENT (OUTPATIENT)
Dept: OBGYN | Facility: CLINIC | Age: 33
End: 2023-06-23
Payer: COMMERCIAL

## 2023-06-23 ENCOUNTER — ASOB RESULT (OUTPATIENT)
Age: 33
End: 2023-06-23

## 2023-06-23 PROCEDURE — 76830 TRANSVAGINAL US NON-OB: CPT

## 2023-08-01 ENCOUNTER — APPOINTMENT (OUTPATIENT)
Dept: OBGYN | Facility: CLINIC | Age: 33
End: 2023-08-01
Payer: COMMERCIAL

## 2023-08-01 ENCOUNTER — APPOINTMENT (OUTPATIENT)
Dept: OBGYN | Facility: CLINIC | Age: 33
End: 2023-08-01

## 2023-08-01 PROCEDURE — 99422 OL DIG E/M SVC 11-20 MIN: CPT

## 2023-08-01 RX ORDER — SERTRALINE HYDROCHLORIDE 50 MG/1
50 TABLET, FILM COATED ORAL DAILY
Qty: 90 | Refills: 2 | Status: ACTIVE | COMMUNITY
Start: 2023-08-01 | End: 1900-01-01

## 2023-08-18 ENCOUNTER — NON-APPOINTMENT (OUTPATIENT)
Age: 33
End: 2023-08-18

## 2023-08-21 ENCOUNTER — APPOINTMENT (OUTPATIENT)
Dept: OBGYN | Facility: CLINIC | Age: 33
End: 2023-08-21

## 2023-08-21 ENCOUNTER — NON-APPOINTMENT (OUTPATIENT)
Age: 33
End: 2023-08-21

## 2023-08-22 ENCOUNTER — APPOINTMENT (OUTPATIENT)
Dept: OBGYN | Facility: CLINIC | Age: 33
End: 2023-08-22
Payer: COMMERCIAL

## 2023-08-22 DIAGNOSIS — F41.8 OTHER MENTAL DISORDERS COMPLICATING THE PUERPERIUM: ICD-10-CM

## 2023-08-22 PROCEDURE — 99442: CPT

## 2023-08-22 RX ORDER — SERTRALINE 25 MG/1
25 TABLET, FILM COATED ORAL
Qty: 7 | Refills: 0 | Status: ACTIVE | COMMUNITY
Start: 2023-08-22 | End: 1900-01-01

## 2023-08-27 ENCOUNTER — NON-APPOINTMENT (OUTPATIENT)
Age: 33
End: 2023-08-27

## 2023-09-14 ENCOUNTER — NON-APPOINTMENT (OUTPATIENT)
Age: 33
End: 2023-09-14

## 2023-10-09 ENCOUNTER — NON-APPOINTMENT (OUTPATIENT)
Age: 33
End: 2023-10-09

## 2023-12-14 ENCOUNTER — APPOINTMENT (OUTPATIENT)
Dept: OBGYN | Facility: CLINIC | Age: 33
End: 2023-12-14

## 2023-12-15 ENCOUNTER — APPOINTMENT (OUTPATIENT)
Dept: OBGYN | Facility: CLINIC | Age: 33
End: 2023-12-15
Payer: COMMERCIAL

## 2023-12-15 DIAGNOSIS — D24.2 BENIGN NEOPLASM OF LEFT BREAST: ICD-10-CM

## 2023-12-15 PROCEDURE — 99213 OFFICE O/P EST LOW 20 MIN: CPT | Mod: 95

## 2023-12-15 NOTE — REASON FOR VISIT
[Home] : at home, [unfilled] , at the time of the visit. [Other Location: e.g. Home (Enter Location, City,State)___] : at [unfilled] [Patient] : the patient [This encounter was initiated by telehealth (audio with video) and converted to telephone (audio only) due to technical difficulties.] : This encounter was initiated by telehealth (audio with video) and converted to telephone (audio only) due to technical difficulties.

## 2023-12-15 NOTE — HISTORY OF PRESENT ILLNESS
[FreeTextEntry1] : 34 yo  14 mo postpartum. PP course c/b PPD currently being treated with Zoloft 50 mg qD and therapy sessions 1 x weekly. She is feeling very much improved and desires to decrease and ultimately stop Zoloft.    She will be travelling to [place] to see family for this holiday.   PMHx: Ocular migraines SHx: Rhinoplasty FMHx: PGF prostate ca age 80s, MGGM breast ca age 60s Denies Fhx of ovarian, uterinse, pancreatic, and colon cancer OBHx:   FT  Grace  FT  Peter  FT  Roxana complete ab x 1 GynHx: Umbilical hernia, ovulatory pain Denies hx of ovarian cyst, fibroids, ABnl pap, breast bx. All: Ceclor  Meds: Denies  Pt reports that she was intially rx'd Zoloft 50 mg 4 months pp which resolved her symptoms of sadness, irritability, insomnia. After 2 months of improved PPD, she weaned herself off. However, PPD symptoms returned with worsening sadness, anger and guilt in 2023 and she was restarted on Zoloft 50 mg. She reports that her s/s of depression have improved  We discussed coming changing to Zoloft 25 mg po qd after the holiday and staying on this for a month.  If she does well, we can go to  25 mg po q other day and then come of after a month.   She will return to the office for an annual in  and for follow up.   I will put in Rx for B/L breast US given hx fibroadenoma. Referral to [facility] imaging given.

## 2024-01-28 ENCOUNTER — RX RENEWAL (OUTPATIENT)
Age: 34
End: 2024-01-28

## 2024-01-28 RX ORDER — SERTRALINE 25 MG/1
25 TABLET, FILM COATED ORAL DAILY
Qty: 60 | Refills: 1 | Status: ACTIVE | COMMUNITY
Start: 2023-12-15 | End: 1900-01-01

## 2024-02-03 ENCOUNTER — NON-APPOINTMENT (OUTPATIENT)
Age: 34
End: 2024-02-03

## 2024-02-13 ENCOUNTER — APPOINTMENT (OUTPATIENT)
Dept: ULTRASOUND IMAGING | Facility: IMAGING CENTER | Age: 34
End: 2024-02-13

## 2024-02-23 ENCOUNTER — APPOINTMENT (OUTPATIENT)
Dept: ULTRASOUND IMAGING | Facility: CLINIC | Age: 34
End: 2024-02-23
Payer: COMMERCIAL

## 2024-02-23 ENCOUNTER — RESULT REVIEW (OUTPATIENT)
Age: 34
End: 2024-02-23

## 2024-02-23 PROCEDURE — 76641 ULTRASOUND BREAST COMPLETE: CPT | Mod: 50

## 2024-03-06 ENCOUNTER — NON-APPOINTMENT (OUTPATIENT)
Age: 34
End: 2024-03-06

## 2024-03-07 ENCOUNTER — OUTPATIENT (OUTPATIENT)
Dept: OUTPATIENT SERVICES | Facility: HOSPITAL | Age: 34
LOS: 1 days | End: 2024-03-07
Payer: COMMERCIAL

## 2024-03-07 ENCOUNTER — APPOINTMENT (OUTPATIENT)
Dept: RADIOLOGY | Facility: CLINIC | Age: 34
End: 2024-03-07
Payer: COMMERCIAL

## 2024-03-07 DIAGNOSIS — J18.0 BRONCHOPNEUMONIA, UNSPECIFIED ORGANISM: ICD-10-CM

## 2024-03-07 PROCEDURE — 71046 X-RAY EXAM CHEST 2 VIEWS: CPT | Mod: 26

## 2024-03-07 PROCEDURE — 71046 X-RAY EXAM CHEST 2 VIEWS: CPT

## 2024-03-15 ENCOUNTER — APPOINTMENT (OUTPATIENT)
Dept: OBGYN | Facility: CLINIC | Age: 34
End: 2024-03-15
Payer: COMMERCIAL

## 2024-03-15 VITALS
BODY MASS INDEX: 18.2 KG/M2 | WEIGHT: 130 LBS | HEIGHT: 71 IN | DIASTOLIC BLOOD PRESSURE: 73 MMHG | SYSTOLIC BLOOD PRESSURE: 120 MMHG

## 2024-03-15 DIAGNOSIS — Z01.419 ENCOUNTER FOR GYNECOLOGICAL EXAMINATION (GENERAL) (ROUTINE) W/OUT ABNORMAL FINDINGS: ICD-10-CM

## 2024-03-15 DIAGNOSIS — Z30.011 ENCOUNTER FOR INITIAL PRESCRIPTION OF CONTRACEPTIVE PILLS: ICD-10-CM

## 2024-03-15 PROCEDURE — 99395 PREV VISIT EST AGE 18-39: CPT

## 2024-03-15 RX ORDER — DROSPIRENONE AND ESTETROL 3-14.2(28)
3-14.2 KIT ORAL
Qty: 30 | Refills: 2 | Status: ACTIVE | COMMUNITY
Start: 2024-03-15 | End: 1900-01-01

## 2024-03-15 NOTE — HISTORY OF PRESENT ILLNESS
[Patient reported breast sonogram was normal] : Patient reported breast sonogram was normal [Patient reported PAP Smear was normal] : Patient reported PAP Smear was normal [FreeTextEntry1] : 03/15/2024. VANNA SEXTON 33 year old female  LMP 24. She presents for an annual gyn exam.   She reports monthly menses lasting 8 days but notes they are heavy and painful for first 4 days. She denies intermenstrual bleeding. She also reports pain with ovulation, improved slightly with Tylenol. Pt was on Minastrin OCP years ago and desires to start on contraception again.  Pt c/o notable increase in clear vaginal discharge. No malodor.   Pt recently d/c'd Zoloft 2.5 wks ago which she was taking for PPD. She is doing well and is continuing to follow with therapist.  Pt had a breast sono 2024 due to b/l breast tenderness. Sono was normal and pt denies breast tenderness or discomfort today.   She denies abdominal and pelvic pain, no vaginal discharge or vaginitis symptoms. She reports normal urination, no dysuria, no incontinence of urine. BM is normal per patient, no blood in stool or constipation/diarrhea.   No new medical conditions, medications or surgeries.  OBHx:  3/2019 FT  Grace 10/2020 FT  Craig (w/ PPD) 2022 FT  Roxana (w/ PPD) complete ab x 1  PMHx: Ocular migraines, L breast fibroadenoma SHx: Rhinoplasty, L breast bx FMHx: PGF prostate ca age 60s, MGGM breast ca age 60s, PGM lung ca. Denies Fhx of ovarian, uterine, pancreatic, and colon cancer. GynHx: Umbilical hernia, ovulatory pain Denies hx of ovarian cyst, fibroids, ABnl pap, breast bx. All: Ceclor. Sulfa Meds: denies Soc Hx: soc alc, nonsmoker, no drug use  [BreastSonogramDate] : 02/24 [TextBox_25] : previously bx'd L fibroadenoma smaller compared to 2015. [PapSmeardate] : 11/22 [TextBox_31] : NILM HRHPV-

## 2024-03-15 NOTE — PLAN
[FreeTextEntry1] : Routine Gyn Exam: BSA, calcium, vitamin D and exercise d/w pt Pap smear conducted w/ reflex to HPV Advised pt to see PCP and dermatologist annually  Contraception Counseling: R/B/A reviewed for OCP vs IUD. Pt to start on OCP. D/w pt how to take OCP, and also R/B including but not limited to irregular bleeding, changes in mood, weight changes, VTE, MI, Stroke, GB and liver disease. Advised pt to avoid smoking. Pt is a non smoker. D/w pt FHx of no blood clot disorders If pt notes severe mood changes, she will call to switch pill and consider IUD. Rx sent for Nextstellis and samples given.  Vaginal Discharge: Vaginitis panel sent  If sebaceous collection bothers her it can be removed  RTO in 1 year or PRN

## 2024-03-15 NOTE — PHYSICAL EXAM
[Chaperone Present] : A chaperone was present in the examining room during all aspects of the physical examination [Appropriately responsive] : appropriately responsive [Alert] : alert [No Acute Distress] : no acute distress [No Lymphadenopathy] : no lymphadenopathy [Regular Rate Rhythm] : regular rate rhythm [No Murmurs] : no murmurs [Clear to Auscultation B/L] : clear to auscultation bilaterally [Soft] : soft [Non-tender] : non-tender [Non-distended] : non-distended [No HSM] : No HSM [No Lesions] : no lesions [Oriented x3] : oriented x3 [No Mass] : no mass [Examination Of The Breasts] : a normal appearance [No Masses] : no breast masses were palpable [Labia Majora] : normal [Labia Minora] : normal [Normal] : normal [Uterine Adnexae] : normal [FreeTextEntry7] : umbilical hernia noted [FreeTextEntry1] : small sebaceous collection next to varicosity on left labia majora.

## 2024-03-15 NOTE — SIGNATURES
[TextEntry] : This note was written by Edwardo Ward on 03/15/2024 actively solely ANDREW Johnson M.D.  All medical record entries made by the Scribe were at my, ANDREW Johnson M.D. direction and personally dictated by me on 03/15/2024. I have personally reviewed the chart and agree that the record reflects my personal performance of the history, physical exam, assessment, and plan.

## 2024-03-15 NOTE — COUNSELING
[Vitamins/Supplements] : vitamins/supplements [Nutrition/ Exercise/ Weight Management] : nutrition, exercise, weight management [Breast Self Exam] : breast self exam [Contraception/ Emergency Contraception/ Safe Sexual Practices] : contraception, emergency contraception, safe sexual practices

## 2024-03-18 LAB
BV BACTERIA RRNA VAG QL NAA+PROBE: DETECTED
C GLABRATA RNA VAG QL NAA+PROBE: NOT DETECTED
CANDIDA RRNA VAG QL PROBE: NOT DETECTED
HPV HIGH+LOW RISK DNA PNL CVX: NOT DETECTED
T VAGINALIS RRNA SPEC QL NAA+PROBE: NOT DETECTED

## 2024-03-19 DIAGNOSIS — N76.0 ACUTE VAGINITIS: ICD-10-CM

## 2024-03-19 DIAGNOSIS — B96.89 ACUTE VAGINITIS: ICD-10-CM

## 2024-03-20 ENCOUNTER — NON-APPOINTMENT (OUTPATIENT)
Age: 34
End: 2024-03-20

## 2024-03-20 LAB — CYTOLOGY CVX/VAG DOC THIN PREP: NORMAL

## 2024-03-20 RX ORDER — METRONIDAZOLE 7.5 MG/G
0.75 GEL VAGINAL
Qty: 1 | Refills: 0 | Status: ACTIVE | COMMUNITY
Start: 2024-03-19 | End: 1900-01-01

## 2024-03-21 RX ORDER — METRONIDAZOLE 500 MG/1
500 TABLET ORAL TWICE DAILY
Qty: 14 | Refills: 0 | Status: ACTIVE | COMMUNITY
Start: 2024-03-21 | End: 1900-01-01

## 2024-06-05 ENCOUNTER — APPOINTMENT (OUTPATIENT)
Dept: OBGYN | Facility: CLINIC | Age: 34
End: 2024-06-05
Payer: COMMERCIAL

## 2024-06-05 ENCOUNTER — RESULT REVIEW (OUTPATIENT)
Age: 34
End: 2024-06-05

## 2024-06-05 VITALS — DIASTOLIC BLOOD PRESSURE: 73 MMHG | SYSTOLIC BLOOD PRESSURE: 113 MMHG

## 2024-06-05 DIAGNOSIS — N63.10 UNSPECIFIED LUMP IN THE RIGHT BREAST, UNSPECIFIED QUADRANT: ICD-10-CM

## 2024-06-05 PROCEDURE — 99212 OFFICE O/P EST SF 10 MIN: CPT

## 2024-06-05 NOTE — HISTORY OF PRESENT ILLNESS
[FreeTextEntry1] : 2024. VANNA SEXTON 34 year old female  She presents for an acute visit, breast check.  2024 b/l breast sono - BIRADS2, right breast nml, known left breast fibroadenoma 6mm smaller than in 2015 and site of previous bx  She reports palpating a right breast mass since 2wk ago, initially palpated in shower, denies h/o fullness in that area, d/c'ed breastfeeding and pumping 2023. She is on last day of [employer] pack, started it 3/2024, doing well.  She denies intermenstrual bleeding. She denies abdominal and pelvic pain, no vaginal discharge or vaginitis symptoms. She reports normal urination, no dysuria, no incontinence of urine. BM is normal per patient, no blood in stool or constipation/diarrhea.   OBHx: . 3/2019 FT  Grace. 10/2020 FT  Craig (w/ PPD). 2022 FT  Roxana (w/ PPD). Complete ab x 1 PMHx: Ocular migraines, L breast fibroadenoma SHx: Rhinoplasty, L breast bx FMHx: PGF prostate ca age 60s, MGGM breast ca age 60s, PGM lung ca. Denies Fhx of ovarian, uterine, pancreatic, and colon cancer. GynHx: Umbilical hernia, ovulatory pain Denies hx of ovarian cyst, fibroids, Abnl pap, breast bx. All: Ceclor. Sulfa Meds: denies Soc Hx: soc alc, nonsmoker, no drug use *she recorded an album of lenny

## 2024-06-05 NOTE — PLAN
[FreeTextEntry1] : Breast check: Breast exam done today - fibroglandular tissue at 11:00 more pronounced on right breast than left Rx given for diagnostic right breast sonogram - If pt unable to schedule within 2wk, pt to call our office to arrange appt   RTO in 3/2025 for annual or PRN

## 2024-06-05 NOTE — PHYSICAL EXAM
[Chaperone Present] : A chaperone was present in the examining room during all aspects of the physical examination [FreeTextEntry2] : Morales Lynn [Appropriately responsive] : appropriately responsive [Alert] : alert [No Acute Distress] : no acute distress [Mass] : mass [Oriented x3] : oriented x3 [FreeTextEntry6] : fibroglandular tissue at 11:00 on b/l breasts, more pronounced on right breast than left [Examination Of The Breasts] : a normal appearance [Normal] : normal

## 2024-06-10 ENCOUNTER — APPOINTMENT (OUTPATIENT)
Dept: MAMMOGRAPHY | Facility: CLINIC | Age: 34
End: 2024-06-10
Payer: COMMERCIAL

## 2024-06-10 ENCOUNTER — APPOINTMENT (OUTPATIENT)
Dept: ULTRASOUND IMAGING | Facility: CLINIC | Age: 34
End: 2024-06-10
Payer: COMMERCIAL

## 2024-06-10 ENCOUNTER — RESULT REVIEW (OUTPATIENT)
Age: 34
End: 2024-06-10

## 2024-06-10 ENCOUNTER — OUTPATIENT (OUTPATIENT)
Dept: OUTPATIENT SERVICES | Facility: HOSPITAL | Age: 34
LOS: 1 days | End: 2024-06-10
Payer: COMMERCIAL

## 2024-06-10 DIAGNOSIS — Z00.8 ENCOUNTER FOR OTHER GENERAL EXAMINATION: ICD-10-CM

## 2024-06-10 DIAGNOSIS — N63.10 UNSPECIFIED LUMP IN THE RIGHT BREAST, UNSPECIFIED QUADRANT: ICD-10-CM

## 2024-06-10 PROCEDURE — 77066 DX MAMMO INCL CAD BI: CPT | Mod: 26

## 2024-06-10 PROCEDURE — 76642 ULTRASOUND BREAST LIMITED: CPT | Mod: 26,RT

## 2024-06-10 PROCEDURE — G0279: CPT | Mod: 26

## 2024-06-10 PROCEDURE — G0279: CPT

## 2024-06-10 PROCEDURE — 77066 DX MAMMO INCL CAD BI: CPT

## 2024-06-10 PROCEDURE — 76642 ULTRASOUND BREAST LIMITED: CPT

## 2024-06-23 ENCOUNTER — NON-APPOINTMENT (OUTPATIENT)
Age: 34
End: 2024-06-23

## 2024-07-29 ENCOUNTER — RX RENEWAL (OUTPATIENT)
Age: 34
End: 2024-07-29

## 2024-08-20 ENCOUNTER — APPOINTMENT (OUTPATIENT)
Dept: OBGYN | Facility: CLINIC | Age: 34
End: 2024-08-20
Payer: COMMERCIAL

## 2024-08-20 ENCOUNTER — ASOB RESULT (OUTPATIENT)
Age: 34
End: 2024-08-20

## 2024-08-20 VITALS — SYSTOLIC BLOOD PRESSURE: 125 MMHG | DIASTOLIC BLOOD PRESSURE: 75 MMHG

## 2024-08-20 DIAGNOSIS — N89.8 OTHER SPECIFIED NONINFLAMMATORY DISORDERS OF VAGINA: ICD-10-CM

## 2024-08-20 DIAGNOSIS — R35.0 FREQUENCY OF MICTURITION: ICD-10-CM

## 2024-08-20 LAB
BILIRUB UR QL STRIP: NORMAL
CLARITY UR: CLEAR
COLLECTION METHOD: NORMAL
GLUCOSE UR-MCNC: NORMAL
HCG UR QL: 0.2 EU/DL
HGB UR QL STRIP.AUTO: NORMAL
KETONES UR-MCNC: NORMAL
LEUKOCYTE ESTERASE UR QL STRIP: NORMAL
NITRITE UR QL STRIP: NORMAL
PH UR STRIP: 5.5
PROT UR STRIP-MCNC: NORMAL
SP GR UR STRIP: 1.01

## 2024-08-20 PROCEDURE — 99213 OFFICE O/P EST LOW 20 MIN: CPT

## 2024-08-20 PROCEDURE — 76830 TRANSVAGINAL US NON-OB: CPT

## 2024-08-20 PROCEDURE — 81002 URINALYSIS NONAUTO W/O SCOPE: CPT

## 2024-08-20 NOTE — PLAN
[FreeTextEntry1] : 35 yo P3 with pelvic pain , vaginal discharge and increased urinary frequency - f/u urine culture - vaginal swab - pelvic sonogram  f/u prn and for annual

## 2024-08-20 NOTE — PHYSICAL EXAM
[Soft] : soft [Non-distended] : non-distended [FreeTextEntry7] : mild suprapubic tenderness [Labia Majora] : normal [Labia Minora] : normal [Normal] : normal [Uterine Adnexae] : normal [FreeTextEntry6] : No CMT

## 2024-08-20 NOTE — HISTORY OF PRESENT ILLNESS
[FreeTextEntry1] : 33 yo P3 LMP 2 weeks ago presents with 2 weeks of intermittent mild-moderate pelvic pain, increased urinary frequency and increased "greenish" vaginal discharge. Denies fevers, dysuria, new sexual partners  Reports abdominal bloating 1-2 weeks prior to menses- started OCPs 4 mos ag  UA neg

## 2024-08-21 LAB
BV BACTERIA RRNA VAG QL NAA+PROBE: DETECTED
C GLABRATA RNA VAG QL NAA+PROBE: NOT DETECTED
CANDIDA RRNA VAG QL PROBE: NOT DETECTED
T VAGINALIS RRNA SPEC QL NAA+PROBE: NOT DETECTED

## 2024-08-21 RX ORDER — METRONIDAZOLE 7.5 MG/G
0.75 GEL VAGINAL
Qty: 1 | Refills: 0 | Status: ACTIVE | COMMUNITY
Start: 2024-08-21 | End: 1900-01-01

## 2024-08-22 ENCOUNTER — NON-APPOINTMENT (OUTPATIENT)
Age: 34
End: 2024-08-22

## 2024-08-22 LAB — BACTERIA UR CULT: NORMAL

## 2024-09-24 ENCOUNTER — APPOINTMENT (OUTPATIENT)
Dept: OBGYN | Facility: CLINIC | Age: 34
End: 2024-09-24
Payer: COMMERCIAL

## 2024-09-24 DIAGNOSIS — R23.2 FLUSHING: ICD-10-CM

## 2024-09-24 PROCEDURE — 99213 OFFICE O/P EST LOW 20 MIN: CPT

## 2024-09-24 RX ORDER — METRONIDAZOLE 500 MG/1
500 TABLET ORAL TWICE DAILY
Qty: 14 | Refills: 0 | Status: ACTIVE | COMMUNITY
Start: 2024-09-24 | End: 1900-01-01

## 2024-09-24 RX ORDER — PUMPKIN SEED EXTRACT/SOY GERM 300 MG
600 CAPSULE ORAL
Qty: 14 | Refills: 0 | Status: ACTIVE | COMMUNITY
Start: 2024-09-24 | End: 1900-01-01

## 2024-09-24 NOTE — PLAN
[FreeTextEntry1] : 35 yo with recurrent vaginal discharge - f/u BD affirm and vaginal culture and GCCT - Rx sent for Metronidazole PO x 7 days - Boric Acid 600mg daily x 14 days - probiotic  Hot Flushes - will discontinue OCPs  and use condoms - monitor symptoms for 3 mos - f/u TSH/FT4, FSH, LH, Prolactin, estradiol  f/u prn and for annual

## 2024-09-24 NOTE — HISTORY OF PRESENT ILLNESS
[FreeTextEntry1] : 35 yo P2 presents with persistent vaginal discharge. pt seen 8/2024 with similar symptoms- culture showed BV and pt treated with metrogel. reports symptoms improved, but then recurred  Pt reports monogamous relationship and practices good hygiene.  Also c/o night sweats and hot flushes as well as mood irritability. Pt concerned her "hormones are all over the place" and wants to discontinue OCPs and use condoms for now.

## 2024-09-25 LAB
FSH SERPL-MCNC: 2.4 IU/L
LH SERPL-ACNC: 1.1 IU/L
PROGEST SERPL-MCNC: 0.2 NG/ML
T4 FREE SERPL-MCNC: 1.1 NG/DL
TSH SERPL-ACNC: 2.54 UIU/ML

## 2024-09-27 LAB
A VAGINAE DNA VAG QL NAA+PROBE: NORMAL
BACTERIA GENITAL AEROBE CULT: NORMAL
BVAB2 DNA VAG QL NAA+PROBE: NORMAL
C KRUSEI DNA VAG QL NAA+PROBE: NEGATIVE
C KRUSEI DNA VAG QL NAA+PROBE: NEGATIVE
M GENITALIUM DNA SPEC QL NAA+PROBE: NEGATIVE
M HOMINIS DNA SPEC QL NAA+PROBE: NEGATIVE
MEGA1 DNA VAG QL NAA+PROBE: NORMAL
T VAGINALIS RRNA SPEC QL NAA+PROBE: NEGATIVE
UREAPLASMA DNA SPEC QL NAA+PROBE: NEGATIVE

## 2024-09-30 ENCOUNTER — TRANSCRIPTION ENCOUNTER (OUTPATIENT)
Age: 34
End: 2024-09-30

## 2024-09-30 LAB — ESTRADIOL SERPL-MCNC: 7 PG/ML

## 2025-01-31 ENCOUNTER — NON-APPOINTMENT (OUTPATIENT)
Age: 35
End: 2025-01-31

## 2025-02-25 ENCOUNTER — APPOINTMENT (OUTPATIENT)
Dept: OBGYN | Facility: CLINIC | Age: 35
End: 2025-02-25
Payer: COMMERCIAL

## 2025-02-25 VITALS — SYSTOLIC BLOOD PRESSURE: 113 MMHG | DIASTOLIC BLOOD PRESSURE: 71 MMHG

## 2025-02-25 PROCEDURE — 10060 I&D ABSCESS SIMPLE/SINGLE: CPT

## 2025-02-25 PROCEDURE — 99214 OFFICE O/P EST MOD 30 MIN: CPT | Mod: 25

## 2025-03-25 ENCOUNTER — APPOINTMENT (OUTPATIENT)
Dept: ULTRASOUND IMAGING | Facility: CLINIC | Age: 35
End: 2025-03-25

## 2025-04-02 ENCOUNTER — APPOINTMENT (OUTPATIENT)
Dept: OBGYN | Facility: CLINIC | Age: 35
End: 2025-04-02
Payer: COMMERCIAL

## 2025-04-02 VITALS — SYSTOLIC BLOOD PRESSURE: 124 MMHG | DIASTOLIC BLOOD PRESSURE: 72 MMHG

## 2025-04-02 DIAGNOSIS — N92.6 IRREGULAR MENSTRUATION, UNSPECIFIED: ICD-10-CM

## 2025-04-02 LAB — HCG UR QL: NEGATIVE

## 2025-04-02 PROCEDURE — 81025 URINE PREGNANCY TEST: CPT

## 2025-04-02 PROCEDURE — 99213 OFFICE O/P EST LOW 20 MIN: CPT

## 2025-04-04 ENCOUNTER — NON-APPOINTMENT (OUTPATIENT)
Age: 35
End: 2025-04-04

## 2025-04-04 LAB
BASOPHILS # BLD AUTO: 0.05 K/UL
BASOPHILS NFR BLD AUTO: 0.8 %
EOSINOPHIL # BLD AUTO: 0.11 K/UL
EOSINOPHIL NFR BLD AUTO: 1.8 %
ESTIMATED AVERAGE GLUCOSE: 108 MG/DL
ESTRADIOL SERPL-MCNC: 45 PG/ML
FSH SERPL-MCNC: 6.5 IU/L
HBA1C MFR BLD HPLC: 5.4 %
HCG SERPL-MCNC: <1 MIU/ML
HCT VFR BLD CALC: 44.5 %
HGB BLD-MCNC: 13.9 G/DL
IMM GRANULOCYTES NFR BLD AUTO: 0.2 %
LH SERPL-ACNC: 5.7 IU/L
LYMPHOCYTES # BLD AUTO: 2.09 K/UL
LYMPHOCYTES NFR BLD AUTO: 34.7 %
MAN DIFF?: NORMAL
MCHC RBC-ENTMCNC: 29.8 PG
MCHC RBC-ENTMCNC: 31.2 G/DL
MCV RBC AUTO: 95.5 FL
MONOCYTES # BLD AUTO: 0.29 K/UL
MONOCYTES NFR BLD AUTO: 4.8 %
NEUTROPHILS # BLD AUTO: 3.47 K/UL
NEUTROPHILS NFR BLD AUTO: 57.7 %
PLATELET # BLD AUTO: 354 K/UL
PROGEST SERPL-MCNC: 0.5 NG/ML
PROLACTIN SERPL-MCNC: 12.6 NG/ML
RBC # BLD: 4.66 M/UL
RBC # FLD: 12.9 %
T4 FREE SERPL-MCNC: 1.2 NG/DL
TSH SERPL-ACNC: 1.89 UIU/ML
WBC # FLD AUTO: 6.02 K/UL

## 2025-04-14 NOTE — OB RN PATIENT PROFILE - NS PRO DEPRESSION SCREENING Y/N6
Copied from CRM #[id number]. Topic: MW Medication/Rx - MW Rx Refill  >> Apr 14, 2025 11:43 AM Cathi MONROY wrote:  maddi called to request a medication refill and is out of medications or critically low during working hours. Medication is:  Listed on Med Management list. {ECO/Care Site to Process:341382}   no

## 2025-04-25 ENCOUNTER — APPOINTMENT (OUTPATIENT)
Dept: OBGYN | Facility: CLINIC | Age: 35
End: 2025-04-25

## 2025-04-25 ENCOUNTER — NON-APPOINTMENT (OUTPATIENT)
Age: 35
End: 2025-04-25

## 2025-04-25 VITALS
DIASTOLIC BLOOD PRESSURE: 71 MMHG | SYSTOLIC BLOOD PRESSURE: 117 MMHG | HEIGHT: 71 IN | BODY MASS INDEX: 18.76 KG/M2 | WEIGHT: 134 LBS

## 2025-04-25 DIAGNOSIS — N64.4 MASTODYNIA: ICD-10-CM

## 2025-04-25 DIAGNOSIS — N94.6 DYSMENORRHEA, UNSPECIFIED: ICD-10-CM

## 2025-04-25 DIAGNOSIS — N94.0 MITTELSCHMERZ: ICD-10-CM

## 2025-04-25 DIAGNOSIS — Z01.419 ENCOUNTER FOR GYNECOLOGICAL EXAMINATION (GENERAL) (ROUTINE) W/OUT ABNORMAL FINDINGS: ICD-10-CM

## 2025-04-25 DIAGNOSIS — N89.8 OTHER SPECIFIED NONINFLAMMATORY DISORDERS OF VAGINA: ICD-10-CM

## 2025-04-25 PROCEDURE — 76830 TRANSVAGINAL US NON-OB: CPT

## 2025-04-25 PROCEDURE — 99459 PELVIC EXAMINATION: CPT

## 2025-04-25 PROCEDURE — 99395 PREV VISIT EST AGE 18-39: CPT

## 2025-04-25 PROCEDURE — G0444 DEPRESSION SCREEN ANNUAL: CPT | Mod: 59

## 2025-04-25 RX ORDER — NAPROXEN 500 MG/1
500 TABLET ORAL
Qty: 30 | Refills: 1 | Status: ACTIVE | COMMUNITY
Start: 2025-04-25 | End: 1900-01-01

## 2025-04-26 LAB
BV BACTERIA RRNA VAG QL NAA+PROBE: NOT DETECTED
C GLABRATA RNA VAG QL NAA+PROBE: NOT DETECTED
CANDIDA RRNA VAG QL PROBE: NOT DETECTED
T VAGINALIS RRNA SPEC QL NAA+PROBE: NOT DETECTED

## 2025-04-28 LAB — HPV HIGH+LOW RISK DNA PNL CVX: NOT DETECTED

## 2025-04-30 LAB — CYTOLOGY CVX/VAG DOC THIN PREP: NORMAL

## 2025-05-20 ENCOUNTER — RESULT REVIEW (OUTPATIENT)
Age: 35
End: 2025-05-20

## 2025-05-20 ENCOUNTER — APPOINTMENT (OUTPATIENT)
Dept: ULTRASOUND IMAGING | Facility: CLINIC | Age: 35
End: 2025-05-20
Payer: COMMERCIAL

## 2025-05-20 ENCOUNTER — APPOINTMENT (OUTPATIENT)
Dept: ULTRASOUND IMAGING | Facility: CLINIC | Age: 35
End: 2025-05-20

## 2025-05-20 ENCOUNTER — APPOINTMENT (OUTPATIENT)
Dept: MAMMOGRAPHY | Facility: CLINIC | Age: 35
End: 2025-05-20

## 2025-05-20 ENCOUNTER — APPOINTMENT (OUTPATIENT)
Dept: MAMMOGRAPHY | Facility: CLINIC | Age: 35
End: 2025-05-20
Payer: COMMERCIAL

## 2025-05-20 PROCEDURE — 76641 ULTRASOUND BREAST COMPLETE: CPT | Mod: RT

## 2025-05-20 PROCEDURE — G0279: CPT | Mod: RT

## 2025-05-20 PROCEDURE — 77065 DX MAMMO INCL CAD UNI: CPT | Mod: RT

## 2025-06-13 NOTE — OB RN DELIVERY SUMMARY - NS_PLACENTDISPOA_OBGYN_ALL_OB
Time reflects when diagnosis was documented in both MDM as applicable and the Disposition within this note       Time User Action Codes Description Comment    6/13/2025 12:43 PM Jenna Philip Add [R55] Syncope           ED Disposition       ED Disposition   Discharge    Condition   Stable    Date/Time   Fri Jun 13, 2025 12:43 PM    Comment   Carina Rosales discharge to home/self care.                   Assessment & Plan       Medical Decision Making  Differential diagnosis including but not limited to: vasovagal syncope, cardiac arrhythmia, metabolic abnormality, anemia, ectopic pregnancy     Preg test negative. No arrhythmias seen on ECG or on monitor while here in the ER. Hgb normal. No metabolic abnormality seen on blood work. Doubt seizure as no tongue biting or other signs of injury. It does sound like vasovagal syncope.     I have discussed the plan to discharge pt from ED. The patient was discharged in stable condition.  Patient ambulated off the department.  Extensive return to emergency department precautions were discussed.  Follow up with appropriate providers including primary care physician was discussed.  Patient and/or their  primary decision maker expressed understanding.  Patient remained stable during entire emergency department stay.            Amount and/or Complexity of Data Reviewed  Labs: ordered. Decision-making details documented in ED Course.        ED Course as of 06/13/25 1256   Fri Jun 13, 2025   1224 PREGNANCY, SERUM: Negative   1224 WBC: 6.30   1224 Hemoglobin: 13.5   1234 Pt is back to baseline- talking and eating and drinking. Reports she had just woken up and went to the bathroom and felt like she was going to pass out. Was having bad period cramps at the time and also trying to have a bowel movement. States she is unsure why she was unable to talk when she arrived here. Reports it felt like she couldn't get words out. Pt reporting feeling better now       Medications   lactated  ringers bolus 1,000 mL (0 mL Intravenous Stopped 6/13/25 1252)       ED Risk Strat Scores                    No data recorded                            History of Present Illness       Chief Complaint   Patient presents with    Syncope     Pt presents with family, states she had a syncopal episode in the bathroom, reports menstrual cramps, unknown if has happened before, mother is on the way.       Past Medical History[1]   Past Surgical History[2]   Family History[3]   Social History[4]   E-Cigarette/Vaping      E-Cigarette/Vaping Substances      I have reviewed and agree with the history as documented.     15 YOF presents today after syncopal episode. Pt is refusing to speak, is able to shake her head yes and no to answer questions. Per sister pt was sitting on the toilet, trying to have a bowel movement and started to feel like she was going to pass out. Texted her sister that she felt like she was about to pass out and then when the sister came in to the bathroom the pt was passed out. Pt denies hitting her head. Reports she has some lower abdominal pain.         Review of Systems        Objective       ED Triage Vitals   Temperature Pulse Blood Pressure Respirations SpO2 Patient Position - Orthostatic VS   06/13/25 1224 06/13/25 1105 06/13/25 1105 06/13/25 1105 06/13/25 1105 06/13/25 1105   97.9 °F (36.6 °C) 61 (!) 118/63 18 100 % Sitting      Temp src Heart Rate Source BP Location FiO2 (%) Pain Score    06/13/25 1224 06/13/25 1105 06/13/25 1105 -- --    Oral Monitor Right arm        Vitals      Date and Time Temp Pulse SpO2 Resp BP Pain Score FACES Pain Rating User   06/13/25 1224 97.9 °F (36.6 °C) -- -- -- -- -- -- SS   06/13/25 1145 -- 62 99 % 18 100/58 -- -- SS   06/13/25 1105 -- 61 100 % 18 118/63 -- --             Physical Exam    Results Reviewed       Procedure Component Value Units Date/Time    hCG, qualitative pregnancy [[id number]]  (Normal) Collected: 06/13/25 1138    Lab Status: Final result  Specimen: Blood from Arm, Right Updated: 06/13/25 1216     Preg, Serum Negative    TSH, 3rd generation with Free T4 reflex [[id number]]  (Normal) Collected: 06/13/25 1138    Lab Status: Final result Specimen: Blood from Arm, Right Updated: 06/13/25 1216     TSH 3RD GENERATION 1.562 uIU/mL     Narrative:      The reference range(s) associated with this test is specific to the age of this patient as referenced from [employer] Handbook, 22nd Edition, 2021.    Comprehensive metabolic panel [[id number]]  (Abnormal) Collected: 06/13/25 1138    Lab Status: Final result Specimen: Blood from Arm, Right Updated: 06/13/25 1210     Sodium 137 mmol/L      Potassium 4.2 mmol/L      Chloride 108 mmol/L      CO2 23 mmol/L      ANION GAP 6 mmol/L      BUN 9 mg/dL      Creatinine 0.58 mg/dL      Glucose 92 mg/dL      Calcium 9.2 mg/dL      AST 16 U/L      ALT 7 U/L      Alkaline Phosphatase 95 U/L      Total Protein 7.4 g/dL      Albumin 4.5 g/dL      Total Bilirubin 0.97 mg/dL      eGFR --    Narrative:      The reference range(s) associated with this test is specific to the age of this patient as referenced from [employer] Handbook, 22nd Edition, 2021.  Notes:     1. eGFR calculation is only valid for adults 18 years and older.  2. EGFR calculation cannot be performed for patients who are transgender, non-binary, or whose legal sex, sex at birth, and gender identity differ.    Acetaminophen level-If concentration is detectable, please discuss with medical  on call. [[id number]]  (Abnormal) Collected: 06/13/25 1138    Lab Status: Final result Specimen: Blood from Arm, Right Updated: 06/13/25 1210     Acetaminophen Level <2 ug/mL     Magnesium [[id number]]  (Normal) Collected: 06/13/25 1138    Lab Status: Final result Specimen: Blood from Arm, Right Updated: 06/13/25 1210     Magnesium 2.1 mg/dL     Narrative:      The reference range(s) associated with this test is specific to the age of this patient as referenced from  [employer] [facility] Handbook, 22nd Edition, 2021.    Salicylate level [[id number]]  (Normal) Collected: 06/13/25 1138    Lab Status: Final result Specimen: Blood from Arm, Right Updated: 06/13/25 1210     Salicylate Lvl 5 mg/dL     Ethanol [[id number]]  (Normal) Collected: 06/13/25 1138    Lab Status: Final result Specimen: Blood from Arm, Right Updated: 06/13/25 1158     Ethanol Lvl <10 mg/dL     CBC and differential [[id number]] Collected: 06/13/25 1138    Lab Status: Final result Specimen: Blood from Arm, Right Updated: 06/13/25 1145     WBC 6.30 Thousand/uL      RBC 4.74 Million/uL      Hemoglobin 13.5 g/dL      Hematocrit 40.3 %      MCV 85 fL      MCH 28.5 pg      MCHC 33.5 g/dL      RDW 12.6 %      MPV 10.1 fL      Platelets 213 Thousands/uL      nRBC 0 /100 WBCs      Segmented % 60 %      Immature Grans % 1 %      Lymphocytes % 29 %      Monocytes % 8 %      Eosinophils Relative 1 %      Basophils Relative 1 %      Absolute Neutrophils 3.88 Thousands/µL      Absolute Immature Grans 0.03 Thousand/uL      Absolute Lymphocytes 1.80 Thousands/µL      Absolute Monocytes 0.47 Thousand/µL      Eosinophils Absolute 0.09 Thousand/µL      Basophils Absolute 0.03 Thousands/µL     Rapid drug screen, urine [[id number]]     Lab Status: No result Specimen: Urine             No orders to display       ECG 12 Lead Documentation Only    Date/Time: 6/13/2025 11:35 AM    Performed by: Jenna Philip PA-C  Authorized by: Jenna Philip PA-C    Indications / Diagnosis:  Syncope  ECG reviewed by me, the ED Provider: yes    Patient location:  ED  Previous ECG:     Previous ECG:  Unavailable  Interpretation:     Interpretation: non-specific    Rate:     ECG rate:  69    ECG rate assessment: normal    Rhythm:     Rhythm: sinus rhythm    Ectopy:     Ectopy: none    QRS:     QRS axis:  Normal    QRS intervals:  Normal  Conduction:     Conduction: normal    ST segments:     ST segments:  Normal  T waves:     T waves: normal         ED Medication and Procedure Management   None     Patient's Medications    No medications on file     No discharge procedures on file.  ED SEPSIS DOCUMENTATION   Time reflects when diagnosis was documented in both MDM as applicable and the Disposition within this note       Time User Action Codes Description Comment    6/13/2025 12:43 PM Jenna Philip Add [R55] Syncope                      [1] No past medical history on file.  [2] No past surgical history on file.  [3] No family history on file.  [4]   Social History  Tobacco Use    Smoking status: Never    Smokeless tobacco: Never        Jenna Philip PA-C  06/13/25 1257     Discarded in the usual manner